# Patient Record
Sex: FEMALE | Race: WHITE | NOT HISPANIC OR LATINO | Employment: FULL TIME | URBAN - METROPOLITAN AREA
[De-identification: names, ages, dates, MRNs, and addresses within clinical notes are randomized per-mention and may not be internally consistent; named-entity substitution may affect disease eponyms.]

---

## 2017-08-16 ENCOUNTER — TRANSFERRED RECORDS (OUTPATIENT)
Dept: HEALTH INFORMATION MANAGEMENT | Facility: CLINIC | Age: 47
End: 2017-08-16

## 2017-08-16 LAB
CHOLEST SERPL-MCNC: 242 MG/DL (ref 100–199)
GLUCOSE SERPL-MCNC: 90 MG/DL (ref 65–99)
HDLC SERPL-MCNC: 47 MG/DL
LDLC SERPL CALC-MCNC: 167 MG/DL (ref 0–99)
TRIGL SERPL-MCNC: 140 MG/DL (ref 0–149)

## 2017-10-12 ENCOUNTER — ALLIED HEALTH/NURSE VISIT (OUTPATIENT)
Dept: FAMILY MEDICINE | Facility: CLINIC | Age: 47
End: 2017-10-12
Payer: COMMERCIAL

## 2017-10-12 DIAGNOSIS — Z23 NEED FOR PROPHYLACTIC VACCINATION AND INOCULATION AGAINST INFLUENZA: Primary | ICD-10-CM

## 2017-10-12 PROCEDURE — 90471 IMMUNIZATION ADMIN: CPT

## 2017-10-12 PROCEDURE — 90686 IIV4 VACC NO PRSV 0.5 ML IM: CPT

## 2017-10-12 NOTE — MR AVS SNAPSHOT
After Visit Summary   10/12/2017    Sugar Bishop    MRN: [id number]           Patient Information     Date Of Birth          1970        Visit Information        Provider Department      10/12/2017 11:00 AM NL FLOAT TEAM D [facility]        Today's Diagnoses     Need for prophylactic vaccination and inoculation against influenza    -  1       Follow-ups after your visit        Who to contact     If you have questions or need follow up information about today's clinic visit or your schedule please contact [facility] directly at [phone].  Normal or non-critical lab and imaging results will be communicated to you by OneRiothart, letter or phone within 4 business days after the clinic has received the results. If you do not hear from us within 7 days, please contact the clinic through Editlitet or phone. If you have a critical or abnormal lab result, we will notify you by phone as soon as possible.  Submit refill requests through Counsyl or call your pharmacy and they will forward the refill request to us. Please allow 3 business days for your refill to be completed.          Additional Information About Your Visit        MyChart Information     Counsyl gives you secure access to your electronic health record. If you see a primary care provider, you can also send messages to your care team and make appointments. If you have questions, please call your primary care clinic.  If you do not have a primary care provider, please call [phone] and they will assist you.        Care EveryWhere ID     This is your Care EveryWhere ID. This could be used by other organizations to access your [place] medical records  [id number]         Blood Pressure from Last 3 Encounters:   09/20/16 104/70   05/17/16 110/60   07/24/13 102/68    Weight from Last 3 Encounters:   09/20/16 146 lb 6.4 oz (66.4 kg)   05/17/16 145 lb 8 oz (66 kg)   07/24/13 146 lb 14.4 oz (66.6 kg)               We Performed the Following     FLU VAC, SPLIT VIRUS IM > 3 YO (QUADRIVALENT) [75495]     Vaccine Administration, Initial [84570]        Primary Care Provider    None Specified       No primary provider on file.        Equal Access to Services     [place] : Hadii aad ku hadsloaneross Ralph, perry davis, jf layne, agustin paul jose enriquegabriella alvarado elo lund. So [facility] [phone].    ATENCIÓN: Si habla español, tiene a bustillo disposición servicios gratuitos de asistencia lingüística. Llame al [phone].    We comply with applicable federal civil rights laws and [place] laws. We do not discriminate on the basis of race, color, national origin, age, disability, sex, sexual orientation, or gender identity.            Thank you!     Thank you for choosing [facility]  for your care. Our goal is always to provide you with excellent care. Hearing back from our patients is one way we can continue to improve our services. Please take a few minutes to complete the written survey that you may receive in the mail after your visit with us. Thank you!             Your Updated Medication List - Protect others around you: Learn how to safely use, store and throw away your medicines at www.disposemymeds.org.          This list is accurate as of: 10/12/17 11:13 AM.  Always use your most recent med list.                   Brand Name Dispense Instructions for use Diagnosis    aspirin 81 MG tablet      Take  by mouth daily.        aspirin-acetaminophen-caffeine 250-250-65 MG per tablet    EXCEDRIN MIGRAINE     Take 2 tablets by mouth every 6 hours as needed.        ferrous sulfate 325 (65 FE) MG tablet    IRON     Take by mouth daily (with breakfast)        FISH OIL PO      Take  by mouth.        ibuprofen 600 MG tablet    ADVIL/MOTRIN    30 tablet    Take 1 tablet by mouth every 6 hours as needed for pain.        MAGNESIUM OXIDE PO      Take 250 mg by mouth        potassium gluconate 2.5 MEQ Tabs  tablet           TYLENOL 325 MG tablet   Generic drug:  acetaminophen      Take 1-2 tablets by mouth every 6 hours as needed.        * UNABLE TO FIND      MEDICATION NAME: Allergy capsule        * UNABLE TO FIND      MEDICATION NAME: Fiber 1 cap three times per day        VITAMIN B 12 PO      Take 1,000 mcg by mouth        VITAMIN D3 PO      Take 1,000 Units by mouth daily        VITAMIN E NATURAL PO           zinc sulfate 220 (50 ZN) MG capsule    ZINCATE     Take 50 mg by mouth daily        * Notice:  This list has 2 medication(s) that are the same as other medications prescribed for you. Read the directions carefully, and ask your doctor or other care provider to review them with you.

## 2017-10-12 NOTE — PROGRESS NOTES
Injectable Influenza Immunization Documentation    1.  Is the person to be vaccinated sick today?   No    2. Does the person to be vaccinated have an allergy to a component   of the vaccine?   No    3. Has the person to be vaccinated ever had a serious reaction   to influenza vaccine in the past?   No    4. Has the person to be vaccinated ever had Guillain-Barré syndrome?   No    Form completed by Betsy Bautista CMA

## 2018-07-06 ENCOUNTER — HEALTH MAINTENANCE LETTER (OUTPATIENT)
Age: 48
End: 2018-07-06

## 2018-08-01 ENCOUNTER — OFFICE VISIT (OUTPATIENT)
Dept: URGENT CARE | Facility: RETAIL CLINIC | Age: 48
End: 2018-08-01
Payer: COMMERCIAL

## 2018-08-01 VITALS
SYSTOLIC BLOOD PRESSURE: 106 MMHG | HEART RATE: 60 BPM | OXYGEN SATURATION: 100 % | TEMPERATURE: 97.6 F | DIASTOLIC BLOOD PRESSURE: 66 MMHG

## 2018-08-01 DIAGNOSIS — L25.9 CONTACT DERMATITIS, UNSPECIFIED CONTACT DERMATITIS TYPE, UNSPECIFIED TRIGGER: ICD-10-CM

## 2018-08-01 DIAGNOSIS — J01.90 ACUTE SINUSITIS WITH SYMPTOMS > 10 DAYS: Primary | ICD-10-CM

## 2018-08-01 PROCEDURE — 99214 OFFICE O/P EST MOD 30 MIN: CPT | Performed by: PHYSICIAN ASSISTANT

## 2018-08-01 RX ORDER — TRIAMCINOLONE ACETONIDE 1 MG/G
CREAM TOPICAL
Qty: 15 G | Refills: 0 | Status: SHIPPED | OUTPATIENT
Start: 2018-08-01 | End: 2020-12-09

## 2018-08-01 ASSESSMENT — ENCOUNTER SYMPTOMS
BACK PAIN: 0
ARTHRALGIAS: 0
SORE THROAT: 0
DIARRHEA: 0
WHEEZING: 0
ABDOMINAL PAIN: 0
TROUBLE SWALLOWING: 0
NAUSEA: 0
CHILLS: 0
COUGH: 1
SINUS PAIN: 1
SINUS PRESSURE: 1
FEVER: 0
MYALGIAS: 0
PALPITATIONS: 0
VOMITING: 0
RHINORRHEA: 0
FATIGUE: 0
SHORTNESS OF BREATH: 0
EYE REDNESS: 0
EYE PAIN: 0
UNEXPECTED WEIGHT CHANGE: 0
CHEST TIGHTNESS: 0

## 2018-08-01 NOTE — MR AVS SNAPSHOT
After Visit Summary   8/1/2018    Sugar Bishop    MRN: [id number]           Patient Information     Date Of Birth          1970        Visit Information        Provider Department      8/1/2018 12:50 PM Aisha Kovacs PA-C [facility]        Today's Diagnoses     Acute sinusitis with symptoms > 10 days    -  1    Contact dermatitis, unspecified contact dermatitis type, unspecified trigger           Follow-ups after your visit        Follow-up notes from your care team     Return if symptoms worsen or fail to improve.      Who to contact     You can reach your care team any time of the day by calling [phone].  Notification of test results:  If you have an abnormal lab result, we will notify you by phone as soon as possible.         Additional Information About Your Visit        MyChart Information     Mode De Fairehart gives you secure access to your electronic health record. If you see a primary care provider, you can also send messages to your care team and make appointments. If you have questions, please call your primary care clinic.  If you do not have a primary care provider, please call [phone] and they will assist you.        Care EveryWhere ID     This is your Care EveryWhere ID. This could be used by other organizations to access your [place] medical records  [id number]        Your Vitals Were     Pulse Temperature Pulse Oximetry             60 97.6  F (36.4  C) (Oral) 100%          Blood Pressure from Last 3 Encounters:   08/01/18 106/66   09/20/16 104/70   05/17/16 110/60    Weight from Last 3 Encounters:   09/20/16 146 lb 6.4 oz (66.4 kg)   05/17/16 145 lb 8 oz (66 kg)   07/24/13 146 lb 14.4 oz (66.6 kg)              Today, you had the following     No orders found for display         Today's Medication Changes          These changes are accurate as of 8/1/18  1:27 PM.  If you have any questions, ask your nurse or doctor.               Start taking these  medicines.        Dose/Directions    amoxicillin-clavulanate 875-125 MG per tablet   Commonly known as:  AUGMENTIN   Used for:  Acute sinusitis with symptoms > 10 days   Started by:  Aisha Kovacs PA-C        Dose:  1 tablet   Take 1 tablet by mouth 2 times daily for 10 days   Quantity:  20 tablet   Refills:  0       triamcinolone 0.1 % cream   Commonly known as:  KENALOG   Used for:  Contact dermatitis, unspecified contact dermatitis type, unspecified trigger   Started by:  Aisha Kovacs PA-C        Apply sparingly to affected area 2-3 times daily for 7-14 days.   Quantity:  15 g   Refills:  0            Where to get your medicines      These medications were sent to [address]  [address], [facility] [address]     Phone:  [phone]     amoxicillin-clavulanate 875-125 MG per tablet    triamcinolone 0.1 % cream                Primary Care Provider Fax #    Physician No Ref-Primary [phone]       No address on file        Equal Access to Services     [facility]: Hadii paradise ku hadasho Soomaali, waaxda luqadaha, qaybta kaalmada adeegyada, waxay wilbertin hayaubree gasca . So [facility] [phone].    ATENCIÓN: Si habla español, tiene a bustillo disposición servicios gratuitos de asistencia lingüística. [facility] [phone].    We comply with applicable federal civil rights laws and [place] laws. We do not discriminate on the basis of race, color, national origin, age, disability, sex, sexual orientation, or gender identity.            Thank you!     Thank you for choosing [facility]  for your care. Our goal is always to provide you with excellent care. Hearing back from our patients is one way we can continue to improve our services. Please take a few minutes to complete the written survey that you may receive in the mail after your visit with us. Thank you!             Your Updated Medication List - Protect others around you: Learn how to  safely use, store and throw away your medicines at www.disposemymeds.org.          This list is accurate as of 8/1/18  1:27 PM.  Always use your most recent med list.                   Brand Name Dispense Instructions for use Diagnosis    amoxicillin-clavulanate 875-125 MG per tablet    AUGMENTIN    20 tablet    Take 1 tablet by mouth 2 times daily for 10 days    Acute sinusitis with symptoms > 10 days       aspirin 81 MG tablet      Take  by mouth daily.        aspirin-acetaminophen-caffeine 250-250-65 MG per tablet    EXCEDRIN MIGRAINE     Take 2 tablets by mouth every 6 hours as needed.        ferrous sulfate 325 (65 Fe) MG tablet    IRON     Take by mouth daily (with breakfast)        FISH OIL PO      Take  by mouth.        ibuprofen 600 MG tablet    ADVIL/MOTRIN    30 tablet    Take 1 tablet by mouth every 6 hours as needed for pain.        MAGNESIUM OXIDE PO      Take 250 mg by mouth        potassium gluconate 2.5 MEQ Tabs tablet           triamcinolone 0.1 % cream    KENALOG    15 g    Apply sparingly to affected area 2-3 times daily for 7-14 days.    Contact dermatitis, unspecified contact dermatitis type, unspecified trigger       TYLENOL 325 MG tablet   Generic drug:  acetaminophen      Take 1-2 tablets by mouth every 6 hours as needed.        * UNABLE TO FIND      MEDICATION NAME: Allergy capsule        * UNABLE TO FIND      MEDICATION NAME: Fiber 1 cap three times per day        VITAMIN B 12 PO      Take 1,000 mcg by mouth        VITAMIN D3 PO      Take 1,000 Units by mouth daily        VITAMIN E NATURAL PO           zinc sulfate 220 (50 Zn) MG capsule    ZINCATE     Take 50 mg by mouth daily        * Notice:  This list has 2 medication(s) that are the same as other medications prescribed for you. Read the directions carefully, and ask your doctor or other care provider to review them with you.

## 2018-08-01 NOTE — PROGRESS NOTES
SUBJECTIVE:   Sugar Bishop is a 47 year old female presenting with a chief complaint of   Chief Complaint   Patient presents with     Cough     productive cough x 3 weeks      Nasal Congestion     nasal congestion x 3 weeks      Derm Problem     posion ivy x 1 week, it is spreading - using a posion ivy wash not working        She is an established patient of [facility].    URI Adult    Onset of symptoms was 3 week(s) ago.  Course of illness is same.    Severity moderate  Current and Associated symptoms: cough - productive and facial pain/pressure  Treatment measures tried include Tylenol/Ibuprofen.  Predisposing factors include None.      Rash    Onset of rash was 1 week(s) ago.   Course of illness is sudden onset.  Severity moderate  Current and Associated symptoms: itching, red and blistering   Location of the rash: arm, lower.  Previous history of a similar rash? No  Recent exposure history: was pulling weeds, not sure if she saw poison ivy  Denies exposure to: dietary change, medications, new household products and new skincare products  Associated symptoms include: nothing.  Treatment measures tried include: moisturizer      Review of Systems   Constitutional: Negative for chills, fatigue, fever and unexpected weight change.   HENT: Positive for sinus pain and sinus pressure. Negative for congestion, ear pain, postnasal drip, rhinorrhea, sore throat and trouble swallowing.    Eyes: Negative for pain, redness and visual disturbance.   Respiratory: Positive for cough. Negative for chest tightness, shortness of breath and wheezing.    Cardiovascular: Negative for chest pain and palpitations.   Gastrointestinal: Negative for abdominal pain, diarrhea, nausea and vomiting.   Musculoskeletal: Negative for arthralgias, back pain and myalgias.   Skin: Positive for rash.       Past Medical History:   Diagnosis Date     Contusion of right hand, initial encounter 5/17/2016     Pain of right hand 5/17/2016     Tendonitis  5/17/2016    right hand      Family History   Problem Relation Age of Onset     Cancer Father      ? type     Current Outpatient Prescriptions   Medication Sig Dispense Refill     amoxicillin-clavulanate (AUGMENTIN) 875-125 MG per tablet Take 1 tablet by mouth 2 times daily for 10 days 20 tablet 0     triamcinolone (KENALOG) 0.1 % cream Apply sparingly to affected area 2-3 times daily for 7-14 days. 15 g 0     UNABLE TO FIND MEDICATION NAME: Allergy capsule       acetaminophen (TYLENOL) 325 MG tablet Take 1-2 tablets by mouth every 6 hours as needed.       aspirin 81 MG tablet Take  by mouth daily.       aspirin-acetaminophen-caffeine (EXCEDRIN MIGRAINE) 250-250-65 MG per tablet Take 2 tablets by mouth every 6 hours as needed.       Cholecalciferol (VITAMIN D3 PO) Take 1,000 Units by mouth daily       Cyanocobalamin (VITAMIN B 12 PO) Take 1,000 mcg by mouth       ferrous sulfate (IRON) 325 (65 FE) MG tablet Take by mouth daily (with breakfast)       ibuprofen (ADVIL,MOTRIN) 600 MG tablet Take 1 tablet by mouth every 6 hours as needed for pain. 30 tablet 1     MAGNESIUM OXIDE PO Take 250 mg by mouth       Omega-3 Fatty Acids (FISH OIL PO) Take  by mouth.       potassium gluconate 2.5 MEQ TABS tablet        UNABLE TO FIND MEDICATION NAME: Fiber 1 cap three times per day       VITAMIN E NATURAL PO        zinc sulfate (ZINCATE) 220 MG capsule Take 50 mg by mouth daily       Social History   Substance Use Topics     Smoking status: Former Smoker     Packs/day: 0.50     Quit date: 5/1/2004     Smokeless tobacco: Never Used     Alcohol use Yes      Comment: Less than once/year       OBJECTIVE  /66  Pulse 60  Temp 97.6  F (36.4  C) (Oral)  SpO2 100%    Physical Exam   Constitutional: She appears well-developed and well-nourished.   HENT:   Head: Normocephalic.   Right Ear: Tympanic membrane and ear canal normal.   Left Ear: Tympanic membrane and ear canal normal.   Nose: Mucosal edema and rhinorrhea present.    Mouth/Throat: Oropharynx is clear and moist.   Eyes: Conjunctivae are normal. Pupils are equal, round, and reactive to light.   Cardiovascular: Normal rate and normal heart sounds.    Pulmonary/Chest: Effort normal and breath sounds normal.   Skin: Skin is warm and dry.        Dermatitis type rash on both forearms. No signs of infection.    Psychiatric: She has a normal mood and affect. Her behavior is normal.       Labs:  No results found for this or any previous visit (from the past 24 hour(s)).    X-Ray was not done.    ASSESSMENT:      ICD-10-CM    1. Acute sinusitis with symptoms > 10 days J01.90 amoxicillin-clavulanate (AUGMENTIN) 875-125 MG per tablet   2. Contact dermatitis, unspecified contact dermatitis type, unspecified trigger L25.9 triamcinolone (KENALOG) 0.1 % cream        Medical Decision Making:    Differential Diagnosis:  URI Adult/Peds:  Allergic rhinitis, Sinusitis and Viral upper respiratory illness  Rash: Atopic dermatitis  Contact dermatitis  Dermatitis  Non-specific rash    Serious Comorbid Conditions:  Adult:  None    PLAN:    URI Adult:  Tylenol, Ibuprofen, Fluids, Rest, OTC cough suppressant/expectorant, OTC decongestant/antihistamine and RX sinusitis  Augmentin 875 bid x 10 days     Rash:  Rx triamcinolone x 7-14 days. Avoid new exposures. Avoid scratching. Return to clinic if symptoms worsen or do not improve; otherwise follow up as needed      Followup:    If not improving or if condition worsens, follow up with your Primary Care Provider    There are no Patient Instructions on file for this visit.

## 2018-08-29 ENCOUNTER — TRANSFERRED RECORDS (OUTPATIENT)
Dept: HEALTH INFORMATION MANAGEMENT | Facility: CLINIC | Age: 48
End: 2018-08-29

## 2018-08-30 LAB
CHOLEST SERPL-MCNC: 218 MG/DL (ref 100–199)
HDLC SERPL-MCNC: 39 MG/DL (ref 39–?)
LDLC SERPL CALC-MCNC: 136 MG/DL (ref 0–99)
NONHDLC SERPL-MCNC: ABNORMAL MG/DL
TRIGL SERPL-MCNC: 217 MG/DL (ref 0–149)

## 2018-09-14 ENCOUNTER — DOCUMENTATION ONLY (OUTPATIENT)
Dept: CARDIOLOGY | Facility: CLINIC | Age: 48
End: 2018-09-14

## 2018-10-23 ENCOUNTER — ALLIED HEALTH/NURSE VISIT (OUTPATIENT)
Dept: FAMILY MEDICINE | Facility: CLINIC | Age: 48
End: 2018-10-23
Payer: COMMERCIAL

## 2018-10-23 DIAGNOSIS — Z23 NEED FOR PROPHYLACTIC VACCINATION AND INOCULATION AGAINST INFLUENZA: Primary | ICD-10-CM

## 2018-10-23 PROCEDURE — 90471 IMMUNIZATION ADMIN: CPT

## 2018-10-23 PROCEDURE — 90686 IIV4 VACC NO PRSV 0.5 ML IM: CPT

## 2018-10-23 PROCEDURE — 99207 ZZC NO CHARGE NURSE ONLY: CPT

## 2018-10-23 NOTE — MR AVS SNAPSHOT
After Visit Summary   10/23/2018    Sugar Bishop    MRN: [id number]           Patient Information     Date Of Birth          1970        Visit Information        Provider Department      10/23/2018 9:45 AM ANKIT GAINES MA [facility]        Today's Diagnoses     Need for prophylactic vaccination and inoculation against influenza    -  1       Follow-ups after your visit        Who to contact     If you have questions or need follow up information about today's clinic visit or your schedule please contact [facility] directly at [phone].  Normal or non-critical lab and imaging results will be communicated to you by N4MDhart, letter or phone within 4 business days after the clinic has received the results. If you do not hear from us within 7 days, please contact the clinic through I AM AT or phone. If you have a critical or abnormal lab result, we will notify you by phone as soon as possible.  Submit refill requests through I AM AT or call your pharmacy and they will forward the refill request to us. Please allow 3 business days for your refill to be completed.          Additional Information About Your Visit        MyChart Information     I AM AT gives you secure access to your electronic health record. If you see a primary care provider, you can also send messages to your care team and make appointments. If you have questions, please call your primary care clinic.  If you do not have a primary care provider, please call [phone] and they will assist you.        Care EveryWhere ID     This is your Care EveryWhere ID. This could be used by other organizations to access your [place] medical records  [id number]         Blood Pressure from Last 3 Encounters:   08/01/18 106/66   09/20/16 104/70   05/17/16 110/60    Weight from Last 3 Encounters:   09/20/16 146 lb 6.4 oz (66.4 kg)   05/17/16 145 lb 8 oz (66 kg)   07/24/13 146 lb 14.4 oz (66.6 kg)              We  Performed the Following     FLU VACCINE, SPLIT VIRUS, IM (QUADRIVALENT) [31857]- >3 YRS     Vaccine Administration, Initial [44241]        Primary Care Provider Fax #    Physician No Ref-Primary [phone]       No address on file        Equal Access to Services     [place] : Db paradise puentes thaddeus Rosas, wamanishada luqadaha, [place] kaalsatnam layne, agustin wilbertin hayaaguanaco quispegabriella alvarado elo lund. So [facility] [phone].    ATENCIÓN: Si habla español, tiene a bustillo disposición servicios gratuitos de asistencia lingüística. Llame al [phone].    We comply with applicable federal civil rights laws and [place] laws. We do not discriminate on the basis of race, color, national origin, age, disability, sex, sexual orientation, or gender identity.            Thank you!     Thank you for choosing [facility]  for your care. Our goal is always to provide you with excellent care. Hearing back from our patients is one way we can continue to improve our services. Please take a few minutes to complete the written survey that you may receive in the mail after your visit with us. Thank you!             Your Updated Medication List - Protect others around you: Learn how to safely use, store and throw away your medicines at www.disposemymeds.org.          This list is accurate as of 10/23/18 10:24 AM.  Always use your most recent med list.                   Brand Name Dispense Instructions for use Diagnosis    aspirin 81 MG tablet      Take  by mouth daily.        aspirin-acetaminophen-caffeine 250-250-65 MG per tablet    EXCEDRIN MIGRAINE     Take 2 tablets by mouth every 6 hours as needed.        ferrous sulfate 325 (65 Fe) MG tablet    IRON     Take by mouth daily (with breakfast)        FISH OIL PO      Take  by mouth.        ibuprofen 600 MG tablet    ADVIL/MOTRIN    30 tablet    Take 1 tablet by mouth every 6 hours as needed for pain.        MAGNESIUM OXIDE PO      Take 250 mg by mouth        potassium  gluconate 2.5 MEQ Tabs tablet           triamcinolone 0.1 % cream    KENALOG    15 g    Apply sparingly to affected area 2-3 times daily for 7-14 days.    Contact dermatitis, unspecified contact dermatitis type, unspecified trigger       TYLENOL 325 MG tablet   Generic drug:  acetaminophen      Take 1-2 tablets by mouth every 6 hours as needed.        * UNABLE TO FIND      MEDICATION NAME: Allergy capsule        * UNABLE TO FIND      MEDICATION NAME: Fiber 1 cap three times per day        VITAMIN B 12 PO      Take 1,000 mcg by mouth        VITAMIN D3 PO      Take 1,000 Units by mouth daily        VITAMIN E NATURAL PO           zinc sulfate 220 (50 Zn) MG capsule    ZINCATE     Take 50 mg by mouth daily        * Notice:  This list has 2 medication(s) that are the same as other medications prescribed for you. Read the directions carefully, and ask your doctor or other care provider to review them with you.

## 2018-10-23 NOTE — PROGRESS NOTES
Injectable Influenza Immunization Documentation    1.  Is the person to be vaccinated sick today?   No    2. Does the person to be vaccinated have an allergy to a component   of the vaccine?   No  Egg Allergy Algorithm Link    3. Has the person to be vaccinated ever had a serious reaction   to influenza vaccine in the past?   No    4. Has the person to be vaccinated ever had Guillain-Barré syndrome?   No    Prior to injection verified patient identity using patient's name and date of birth.  Due to injection administration, patient instructed to remain in clinic for 15 minutes  afterwards, and to report any adverse reaction to me immediately.      Form completed by Lorena Armas MA

## 2019-02-18 ENCOUNTER — ANCILLARY PROCEDURE (OUTPATIENT)
Dept: GENERAL RADIOLOGY | Facility: OTHER | Age: 49
End: 2019-02-18
Attending: NURSE PRACTITIONER
Payer: COMMERCIAL

## 2019-02-18 ENCOUNTER — OFFICE VISIT (OUTPATIENT)
Dept: FAMILY MEDICINE | Facility: OTHER | Age: 49
End: 2019-02-18
Payer: COMMERCIAL

## 2019-02-18 VITALS
SYSTOLIC BLOOD PRESSURE: 110 MMHG | DIASTOLIC BLOOD PRESSURE: 64 MMHG | BODY MASS INDEX: 24.02 KG/M2 | TEMPERATURE: 97.8 F | WEIGHT: 144.19 LBS | HEART RATE: 91 BPM | RESPIRATION RATE: 16 BRPM | OXYGEN SATURATION: 100 % | HEIGHT: 65 IN

## 2019-02-18 DIAGNOSIS — M79.671 RIGHT FOOT PAIN: ICD-10-CM

## 2019-02-18 DIAGNOSIS — M79.671 RIGHT FOOT PAIN: Primary | ICD-10-CM

## 2019-02-18 PROCEDURE — 73630 X-RAY EXAM OF FOOT: CPT | Mod: RT

## 2019-02-18 PROCEDURE — 99213 OFFICE O/P EST LOW 20 MIN: CPT | Performed by: NURSE PRACTITIONER

## 2019-02-18 ASSESSMENT — MIFFLIN-ST. JEOR: SCORE: 1278.56

## 2019-02-18 NOTE — PATIENT INSTRUCTIONS
I did not see any abnormalities on your x-ray.  The radiologist will review it as well and they will call if they see anything I did not see.     I would like you to see the podiatrist for further evaluation of this.

## 2019-02-18 NOTE — PROGRESS NOTES
SUBJECTIVE:   Sugar Bishop is a 48 year old female who presents to clinic today for the following health issues:      Musculoskeletal problem/pain      Duration:  Since Dec 30th     Description    Location: Patient was carrying stuff down stairs and fell. Ankle rolled after stepping off a step wrong.      Intensity:  Severe if she twists or turns it     Accompanying signs and symptoms: numbness and tingling    History  Previous similar problem: no   Previous evaluation:  none    Precipitating or alleviating factors:  Trauma or overuse: YES  Aggravating factors include: sitting, standing and walking  Therapies tried and outcome: rest/inactivity, heat and ice. Just doesn't seem to be getting better.     Injury was on .  She was not seen at the time.  She was walking down a step and missed the last step.  She rolled her foot forward and hyperextended her ankle.  Had immediate pain and was able to walk for several days.   Now can walk, but still has significant pain with any extension of her ankle.     Problem list and histories reviewed & adjusted, as indicated.  Additional history: as documented    Patient Active Problem List   Diagnosis     CARDIOVASCULAR SCREENING; LDL GOAL LESS THAN 160     Other viral warts     Tendonitis     Pain of right hand     Contusion of right hand, initial encounter     Past Surgical History:   Procedure Laterality Date      SHLDR ARTHROSCOP,PART ACROMIOPLAS      left     PELVIS LAPAROSCOPY,DX       PELVIS LAPAROSCOPY,DX         Social History     Tobacco Use     Smoking status: Former Smoker     Packs/day: 0.50     Last attempt to quit: 2004     Years since quittin.8     Smokeless tobacco: Never Used   Substance Use Topics     Alcohol use: Yes     Comment: Less than once/year     Family History   Problem Relation Age of Onset     Cancer Father         ? type         Current Outpatient Medications   Medication Sig Dispense Refill     acetaminophen  "(TYLENOL) 325 MG tablet Take 1-2 tablets by mouth every 6 hours as needed.       aspirin 81 MG tablet Take  by mouth daily.       aspirin-acetaminophen-caffeine (EXCEDRIN MIGRAINE) 250-250-65 MG per tablet Take 2 tablets by mouth every 6 hours as needed.       Cholecalciferol (VITAMIN D3 PO) Take 1,000 Units by mouth daily       Cyanocobalamin (VITAMIN B 12 PO) Take 1,000 mcg by mouth       ferrous sulfate (IRON) 325 (65 FE) MG tablet Take by mouth daily (with breakfast)       ibuprofen (ADVIL,MOTRIN) 600 MG tablet Take 1 tablet by mouth every 6 hours as needed for pain. 30 tablet 1     MAGNESIUM OXIDE PO Take 250 mg by mouth       Omega-3 Fatty Acids (FISH OIL PO) Take  by mouth.       potassium gluconate 2.5 MEQ TABS tablet        triamcinolone (KENALOG) 0.1 % cream Apply sparingly to affected area 2-3 times daily for 7-14 days. (Patient not taking: Reported on 2/18/2019) 15 g 0     UNABLE TO FIND MEDICATION NAME: Allergy capsule       UNABLE TO FIND MEDICATION NAME: Fiber 1 cap three times per day       VITAMIN E NATURAL PO        zinc sulfate (ZINCATE) 220 MG capsule Take 50 mg by mouth daily       Allergies   Allergen Reactions     Sulfa Drugs      BP Readings from Last 3 Encounters:   02/18/19 110/64   08/01/18 106/66   09/20/16 104/70    Wt Readings from Last 3 Encounters:   02/18/19 65.4 kg (144 lb 3 oz)   09/20/16 66.4 kg (146 lb 6.4 oz)   05/17/16 66 kg (145 lb 8 oz)                    Reviewed and updated as needed this visit by clinical staff  Tobacco  Allergies  Med Hx  Surg Hx  Fam Hx  Soc Hx      Reviewed and updated as needed this visit by Provider         ROS:  Constitutional, HEENT, cardiovascular, pulmonary, gi and gu systems are negative, except as otherwise noted.    OBJECTIVE:     /64 (BP Location: Left arm, Patient Position: Sitting, Cuff Size: Adult Regular)   Pulse 91   Temp 97.8  F (36.6  C) (Temporal)   Resp 16   Ht 1.641 m (5' 4.6\")   Wt 65.4 kg (144 lb 3 oz)   LMP " 02/07/2019   SpO2 100%   Breastfeeding? No   BMI 24.29 kg/m    Body mass index is 24.29 kg/m .  GENERAL: healthy, alert and no distress  MS: no gross musculoskeletal defects noted, no edema.  Right ankle has no deformity, swelling or bruising.  Has tenderness over the medial ankle, tenderness specifically with extension of her foot downwards.      Diagnostic Test Results:  X-ray: right foot: negative.       ASSESSMENT/PLAN:         1. Right foot pain  Follow up with podiatry for further evaluation and treatment.   - XR Foot Right G/E 3 Views; Future  - PODIATRY/FOOT & ANKLE SURGERY REFERRAL  - PODIATRY/FOOT & ANKLE SURGERY REFERRAL    See Patient Instructions    CATALINA [facility]

## 2019-04-16 ENCOUNTER — TELEPHONE (OUTPATIENT)
Dept: FAMILY MEDICINE | Facility: OTHER | Age: 49
End: 2019-04-16

## 2019-04-16 NOTE — LETTER
[facility]  [address] [facility] [address] [phone]        Sugar Bishop     [facility] [address]      April 29, 2019      Dear Sugar,    I care about your health and have reviewed your health plan, including your medical conditions, medication list, and lab results and am making recommendations based on this review, to better manage your health.    You are in particular need of attention regarding:  -Breast Cancer Screening  -Cervical Cancer Screening  -Wellness (Physical) Visit     I am recommending that you:  -schedule a WELLNESS (Physical) APPOINTMENT with me.   I will check fasting labs the same day - nothing to eat except water and meds for 8-10 hours prior.  -schedule a MAMMOGRAM which is due. You can call  [phone] to schedule your mammogram.    -schedule a PAP SMEAR EXAM. This is a screening test done during a pelvic exam to check for abnormal changes in the cells of the cervix.  Cervical cancer is preventable and curable if abnormal cells are detected and treated early. You can call your clinic at the number listed above to schedule your Pap Smear.      If you've had the preventative screening completed at another facility or feel you're not due for this screening, please call our clinic at the number listed above or send us a My Chart message so we can update our records. We would like to thank you in advance for taking the time to take care of your health.  If you have any questions, please don t hesitate to contact our clinic.    Sincerely,       [facility]

## 2019-04-16 NOTE — TELEPHONE ENCOUNTER
Panel Management Review      Patient has the following on her problem list: None      Composite cancer screening  Chart review shows that this patient is due/due soon for the following Pap Smear and Mammogram  Summary:    Patient is due/failing the following:   MAMMOGRAM, PAP and PHYSICAL    Action needed:   Patient needs office visit for Physical with pap. and Patient needs referral/order: Mammo    Type of outreach:    Sent [employer] message.    Questions for provider review:    None                                                                                                                                    Lorena Armas MA

## 2019-06-27 ENCOUNTER — OFFICE VISIT (OUTPATIENT)
Dept: FAMILY MEDICINE | Facility: OTHER | Age: 49
End: 2019-06-27
Payer: COMMERCIAL

## 2019-06-27 ENCOUNTER — NURSE TRIAGE (OUTPATIENT)
Dept: FAMILY MEDICINE | Facility: CLINIC | Age: 49
End: 2019-06-27

## 2019-06-27 VITALS
TEMPERATURE: 97.3 F | RESPIRATION RATE: 16 BRPM | DIASTOLIC BLOOD PRESSURE: 60 MMHG | WEIGHT: 146 LBS | BODY MASS INDEX: 24.32 KG/M2 | OXYGEN SATURATION: 100 % | HEART RATE: 72 BPM | HEIGHT: 65 IN | SYSTOLIC BLOOD PRESSURE: 108 MMHG
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
## 2019-06-27 PROCEDURE — 99213 OFFICE O/P EST LOW 20 MIN: CPT | Performed by: PHYSICIAN ASSISTANT

## 2019-06-27 ASSESSMENT — PAIN SCALES - GENERAL: PAINLEVEL: NO PAIN (1)

## 2019-06-27 ASSESSMENT — MIFFLIN-ST. JEOR: SCORE: 1286.78

## 2019-06-27 NOTE — TELEPHONE ENCOUNTER
"Is instructed she should be seen today in clinic.  She will call [facility] to see if they will see this issue for her, if not, she has an appointment scheduled in [facility] at 6:00.    She will call back to cancel appointment if she can go to [facility] for this.    Answer Assessment - Initial Assessment Questions  1. ANIMAL: \"What type of animal caused the bite?\" \"Is the injury from a bite or a claw?\" If the animal is a dog or a cat, ask: \"Was it a pet or a stray?\" \"Was it acting ill or behaving strangely?\"      Patient just got a rescue dog with up to date vaccinations.  Bite on fingers  2. LOCATION: \"Where is the bite located?\"       2 fingers, one is fine, the other is swollen, red, warm to the touch and has fluid coming from it.    Protocols used: ANIMAL BITE-A-OH    "

## 2019-06-27 NOTE — PROGRESS NOTES
"Subjective     Sugar Bishop is a 48 year old female who presents to clinic today for the following health issues:    HPI   Concern - Dog Bite  Onset:  Last night    Description:   Dog bit hand while he was scared, left pointer finger before first joint swollen and red: 1 puncture sight warm to touch     Intensity: mild    Progression of Symptoms:  same    Accompanying Signs & Symptoms:  none    Previous history of similar problem:   none    Precipitating factors:   Worsened by: none    Alleviating factors:  Improved by: none  Therapies Tried and outcome: none    - Rescue dog got about 1 year ago, up to date on all shots       RN Triage note from today  Is instructed she should be seen today in clinic.  She will call [facility] to see if they will see this issue for her, if not, she has an appointment scheduled in [facility] at 6:00.     She will call back to cancel appointment if she can go to [facility] for this.     Answer Assessment - Initial Assessment Questions  1. ANIMAL: \"What type of animal caused the bite?\" \"Is the injury from a bite or a claw?\" If the animal is a dog or a cat, ask: \"Was it a pet or a stray?\" \"Was it acting ill or behaving strangely?\"      Patient just got a rescue dog with up to date vaccinations.  Bite on fingers  2. LOCATION: \"Where is the bite located?\"       2 fingers, one is fine, the other is swollen, red, warm to the touch and has fluid coming from it.      Review of Systems   ROS COMP: Constitutional, HEENT, cardiovascular, pulmonary, gi and gu systems are negative, except as otherwise noted.      Objective    /60   Pulse 72   Temp 97.3  F (36.3  C) (Temporal)   Resp 16   Ht 1.641 m (5' 4.6\")   Wt 66.2 kg (146 lb)   LMP 06/07/2019 (Approximate)   SpO2 100%   BMI 24.60 kg/m    Body mass index is 24.6 kg/m .  Physical Exam   GENERAL APPEARANCE: healthy, alert and no distress  EYES: Eyes grossly normal to inspection, PERRLA, conjunctivae and sclerae " without injection or discharge, EOM intact   MS: No musculoskeletal defects are noted and gait is age appropriate without ataxia     Left hand 2nd digit palmar side: Normal ROM, normal sensory exam, normal strength, edema of entire finger, erythema around bite any on proximal portion, tender and warm to touch, not able to express any discharge, no fluctuance felt      Right hand 2nd digit dorsal side: Normal ROM, non-tender, no edema, CMS intact, normal sensory exam, normal strength, small bite any, scabbed over, no edema, no erythema   SKIN: No suspicious lesions or rashes, hydration status appears adeuqate with normal skin turgor   PSYCH: Alert and oriented x3; speech- coherent , normal rate and volume; able to articulate logical thoughts, able to abstract reason, no tangential thoughts, no hallucinations or delusions, mentation appears normal, Mood is euthymic. Affect is appropriate for this mood state and bright. Thought content is free of suicidal ideation, hallucinations, and delusions. Dress is adequate and upkept. Eye contact is good during conversation.       Diagnostic Test Results:  none         Assessment & Plan       ICD-10-CM    1. Dog bite, initial encounter W54.0XXA amoxicillin-clavulanate (AUGMENTIN) 875-125 MG tablet     - Discussed signs of skin infection but no abscess   - Recommend antibiotic  - Discussed antibiotic use, duration, and side effects  - Continue with good wound care  - Discussed watching for infection spreading or abscess formation   - Discussed warning signs that would warrant return to clinic/ED  - Hand out given       The patient indicates understanding of these issues and agrees with the plan.    Return in about 1 week (around 7/4/2019) for if not improving.       Kalee Stevens PA-C  [facility]

## 2019-06-27 NOTE — TELEPHONE ENCOUNTER
Additional Information    Puncture wound or small cut on hands or genitals    Bite looks infected (e.g., red area, red streak, pus, or fever)    Negative: Cut (length > 1/8 inch or 3 mm) or skin tear and any animal    Negative: Bleeding won't stop after 10 minutes of direct pressure (using correct technique)    Negative: Sounds like a serious bite injury to the triager    Negative: Any break in skin (e.g., cut, puncture, or scratch) and wild animal at RISK FOR RABIES (e.g., bat, raccoon, pratt, skunk, coyote, other carnivores)    Negative: Any break in skin (e.g., cut, puncture, or scratch) and dog, cat, or ferret at risk for RABIES (e.g., sick, stray, unprovoked bite, developing country)    Negative: Any break in skin (e.g., cut, puncture, or scratch) and monkey    Negative: Major bleeding (actively dripping or spurting) that can't be stopped    Negative: Sounds like a life-threatening emergency to the triager    Negative: SEVERE pain (e.g., excruciating)    Negative: Non-bite body fluid contact (e.g., saliva, brain) and animal at high-risk for RABIES (e.g., bat, raccoon, skunk, pratt, coyote, and other carnivores)    Negative: Puncture wound (holes through the skin) from cat (teeth or claws)    Negative: Puncture wound or small cut on face    Protocols used: ANIMAL BITE-A-OH

## 2019-06-27 NOTE — PATIENT INSTRUCTIONS
Patient Education     Dog Bite  A dog bite can cause a wound deep enough to break the skin. In such cases, the wound is cleaned and sometimes closed. If the wound is closed, it is usually not completely closed. This is so that fluid can drain if the wound becomes infected. Often, wounds will be left open to heal. In addition to wound care, a tetanus shot may be given, if needed.    Home care    Wash your hands well with soap and warm water before and after caring for the wound. This helps lower the risk of infection.    Care for the wound as directed. If a dressing was applied to the wound, be sure to change it as directed.    If the wound bleeds, place a clean, soft cloth on the wound. Then firmly apply pressure until the bleeding stops. This may take up to 5 minutes. Do not release the pressure and look at the wound during this time.    Most wounds heal within 10 days. But an infection can occur even with proper treatment. So be sure to check the wound daily for signs of infection (see below).    Antibiotics may be prescribed. These help prevent or treat infection. If you re given antibiotics, take them as directed. Also be sure to complete the medicines.  Rabies prevention  Rabies is a virus that can be carried in certain animals. These can include domestic animals such as dogs and cats. Pets fully vaccinated against rabies (2 shots) are at very low risk of infection. But because human rabies is almost always fatal, any biting pet should be confined for 10 days as an extra precaution. In general, if there is a risk for rabies, the following steps may need to be taken:    If someone s pet dog has bitten you, it should be kept in a secure area for the next 10 days to watch for signs of illness. (If the pet owner won t allow this, contact your local animal control center.) If the dog becomes ill or dies during that time, contact your local animal control center at once so the animal may be tested for rabies. If  the dog stays healthy for the next 10 days, there is no danger of rabies in the animal or you.  ? If a stray dog bit you, contact your local animal control center. They can give information on capture, quarantine, and animal rabies testing.  ? If you can t find the animal that bit you in the next 2 days, and if rabies exists in your area, you may need to receive the rabies vaccine series. Call your healthcare provider right away. Or, return to the emergency department promptly.  ? All animal bites should be reported to the local animal control center. If you were not given a form to fill out, you can report this yourself.  Follow-up care  Follow up with your healthcare provider, or as directed.  When to seek medical advice  Call your healthcare provider right away if any of these occur:    Signs of infection:  ? Spreading redness or warmth from the wound  ? Increased pain or swelling  ? Fever of 100.4 F (38 C) or higher, or as directed by your healthcare provider  ? Colored fluid or pus draining from the wound    Signs of rabies infection:  ? Headache  ? Confusion  ? Strange behavior  ? Increased salivating and drooling  ? Seizure    Decreased ability to move any body part near the wound    Bleeding that can't be stopped after 5 minutes of firm pressure  Date Last Reviewed: 3/1/2017    3011-2202 [employer]. [address] [place] [address], [place] [address]. All rights reserved. This information is not intended as a substitute for professional medical care. Always follow your healthcare professional's instructions.

## 2019-06-28 ENCOUNTER — TELEPHONE (OUTPATIENT)
Dept: FAMILY MEDICINE | Facility: OTHER | Age: 49
End: 2019-06-28

## 2019-06-28 NOTE — TELEPHONE ENCOUNTER
Panel Management Review      Patient has the following on her problem list: None      Composite cancer screening  Chart review shows that this patient is due/due soon for the following Pap Smear and Mammogram  Summary:    Patient is due/failing the following:   MAMMOGRAM, PAP and PHYSICAL    Action needed:   Patient needs office visit for physical, pap, mammo.    Type of outreach:    Sent letter.    Questions for provider review:    None                                                                                                                                    ................Kel Londono LPN,   June 28, 2019,      1:41 PM,   [facility]       Chart routed to closed .

## 2019-09-10 ENCOUNTER — TELEPHONE (OUTPATIENT)
Dept: SCHEDULING | Facility: CLINIC | Age: 49
End: 2019-09-10

## 2019-12-08 ENCOUNTER — HEALTH MAINTENANCE LETTER (OUTPATIENT)
Age: 49
End: 2019-12-08

## 2020-03-15 ENCOUNTER — HEALTH MAINTENANCE LETTER (OUTPATIENT)
Age: 50
End: 2020-03-15

## 2020-12-09 ENCOUNTER — OFFICE VISIT (OUTPATIENT)
Dept: FAMILY MEDICINE | Facility: CLINIC | Age: 50
End: 2020-12-09
Payer: COMMERCIAL

## 2020-12-09 VITALS
TEMPERATURE: 99.3 F | OXYGEN SATURATION: 98 % | HEIGHT: 65 IN | RESPIRATION RATE: 18 BRPM | DIASTOLIC BLOOD PRESSURE: 80 MMHG | BODY MASS INDEX: 23.56 KG/M2 | HEART RATE: 94 BPM | SYSTOLIC BLOOD PRESSURE: 128 MMHG | WEIGHT: 141.38 LBS

## 2020-12-09 DIAGNOSIS — M25.531 RIGHT WRIST PAIN: Primary | ICD-10-CM

## 2020-12-09 PROCEDURE — 99213 OFFICE O/P EST LOW 20 MIN: CPT | Performed by: NURSE PRACTITIONER

## 2020-12-09 RX ORDER — INFLUENZA A VIRUS A/GUANGDONG-MAONAN/SWL1536/2019 CNIC-1909 (H1N1) ANTIGEN (FORMALDEHYDE INACTIVATED), INFLUENZA A VIRUS A/HONG KONG/2671/2019 (H3N2) ANTIGEN (FORMALDEHYDE INACTIVATED), INFLUENZA B VIRUS B/PHUKET/3073/2013 ANTIGEN (FORMALDEHYDE INACTIVATED), AND INFLUENZA B VIRUS B/WASHINGTON/02/2019 ANTIGEN (FORMALDEHYDE INACTIVATED) 15; 15; 15; 15 UG/.5ML; UG/.5ML; UG/.5ML; UG/.5ML
INJECTION, SUSPENSION INTRAMUSCULAR
COMMUNITY
Start: 2020-09-16 | End: 2021-08-12

## 2020-12-09 ASSESSMENT — PAIN SCALES - GENERAL: PAINLEVEL: MILD PAIN (3)

## 2020-12-09 ASSESSMENT — MIFFLIN-ST. JEOR: SCORE: 1255.8

## 2020-12-09 NOTE — PROGRESS NOTES
"Subjective     Sugar Bishop is a 50 year old female who presents to clinic today for the following health issues:    HPI         Musculoskeletal problem/pain  Onset/Duration: Last Thursday   Description  Location: wrist - right  Joint Swelling: YES  Redness: YES  Pain: YES  Warmth: no  Intensity:  mild  Progression of Symptoms:  improving  Accompanying signs and symptoms:   Fevers: no  Numbness/tingling/weakness: YES  History  Trauma to the area: no  Recent illness:  no  Previous similar problem: no  Previous evaluation:  no  Precipitating or alleviating factors:  Aggravating factors include: Using that hand or wrist to crasp or turn.   Therapies tried and outcome: nothing    Woke up last Thursday with pain in the right wrist. No acute injury, she works for [employer] but duties vary so no significant repetitive motions that she goes through. She works 2 hours a day. She otherwise is at home. No other acute symptoms of concern.     Review of Systems   Constitutional, HEENT, cardiovascular, pulmonary, gi and gu systems are negative, except as otherwise noted.      Objective    /80   Pulse 94   Temp 99.3  F (37.4  C) (Temporal)   Resp 18   Ht 1.641 m (5' 4.6\")   Wt 64.1 kg (141 lb 6 oz)   LMP 11/17/2020   SpO2 98%   Breastfeeding No   BMI 23.82 kg/m    Body mass index is 23.82 kg/m .  Physical Exam  Vitals signs reviewed.   Constitutional:       Appearance: Normal appearance.   HENT:      Head: Normocephalic and atraumatic.      Nose: Nose normal.   Eyes:      Extraocular Movements: Extraocular movements intact.   Pulmonary:      Effort: Pulmonary effort is normal.   Musculoskeletal:         General: Tenderness present.      Right wrist: She exhibits decreased range of motion, tenderness, bony tenderness and swelling.   Skin:     General: Skin is warm and dry.   Neurological:      General: No focal deficit present.      Mental Status: She is alert and oriented to person, place, and time. "   Psychiatric:         Thought Content: Thought content normal.         Judgment: Judgment normal.                    Assessment & Plan     Right wrist pain  - Discussed home care instructions for wrist sprain/strain - tendonitis.   - Will have her use RICE - Wrist sprint for wrist, gave her exercises for the wrist after the pain is improved.   - Wrist/Arm/Hand Supplies Order for DME - ONLY FOR DME      - She will call clinic if symptoms to not improve in the next 10-14 days.     - Patient verbalized understanding of plan of care and is in agreement with current plan of care.       Xander Kessler NP  [facility]

## 2020-12-09 NOTE — PATIENT INSTRUCTIONS
Patient Education     Muscle Strain in the Extremities  A muscle strain is a stretching and tearing of muscle fibers. This causes pain, especially when you move that muscle. There may also be some swelling and bruising.  Home care    Keep the hurt area raised above heart level to reduce pain and swelling. This is especially important during the first 48 hours.    Apply an ice pack over the injured area for 15 to 20 minutes every 3 to 6 hours. You should do this for the first 24 to 48 hours. You can make an ice pack by filling a plastic bag that seals at the top with ice cubes and then wrapping it with a thin towel. Be careful not to injure your skin with the ice treatments. Ice should never be applied directly to skin. Continue the use of ice packs for relief of pain and swelling as needed. After 48 hours, apply heat (warm shower or warm bath) for 15 to 20 minutes several times a day, or alternate ice and heat.    You may use over-the-counter pain medicine to control pain, unless another medicine was prescribed. If you have chronic liver or kidney disease or ever had a stomach ulcer or gastrointestinal bleeding, talk with your healthcare provider before using these medicines.    For leg strains: If crutches have been recommended, don t put full weight on the hurt leg until you can do so without pain. You can return to sports when you are able to hop and run on the injured leg without pain.  Follow-up care  Follow up with your healthcare provider, or as advised.  When to seek medical advice  Call your healthcare provider right away if any of these occur:    The toes of the injured leg become swollen, cold, blue, numb, or tingly    Pain or swelling increases  [employer] last reviewed this educational content on 5/1/2018 2000-2020 [employer]. [address] [place] [address], [place] [address]. All rights reserved. This information is not intended as a substitute for professional medical care. Always follow  your healthcare professional's instructions.           Patient Education     Wrist Pronation (Strength)    These instructions are for your right wrist. Switch sides for your left wrist.   1. Sit in a chair next to a table. Rest your right forearm on the table. Hang your right wrist off the edge of the table, palm up. Hold a hand weight in your right hand. Your healthcare provider will tell you what size of hand weight to use.  2. Keep your forearm in place and turn your wrist to the left until your thumb is on top.  3. Slowly lower the hand weight back down to the right.  4. Repeat 10 times, or as instructed.  [employer] last reviewed this educational content on 6/1/2019 2000-2020 [employer]. [address] [place] [address], [place] [address]. All rights reserved. This information is not intended as a substitute for professional medical care. Always follow your healthcare professional's instructions.

## 2021-01-09 ENCOUNTER — HEALTH MAINTENANCE LETTER (OUTPATIENT)
Age: 51
End: 2021-01-09

## 2021-05-08 ENCOUNTER — HEALTH MAINTENANCE LETTER (OUTPATIENT)
Age: 51
End: 2021-05-08

## 2021-08-10 ENCOUNTER — HOSPITAL ENCOUNTER (EMERGENCY)
Facility: CLINIC | Age: 51
Discharge: HOME | End: 2021-08-10
Attending: EMERGENCY MEDICINE | Admitting: EMERGENCY MEDICINE
Payer: COMMERCIAL

## 2021-08-10 VITALS
TEMPERATURE: 97.5 F | OXYGEN SATURATION: 98 % | SYSTOLIC BLOOD PRESSURE: 116 MMHG | HEART RATE: 75 BPM | DIASTOLIC BLOOD PRESSURE: 75 MMHG | RESPIRATION RATE: 18 BRPM

## 2021-08-10 DIAGNOSIS — K08.89 PAIN, DENTAL: ICD-10-CM

## 2021-08-10 PROCEDURE — 64400 NJX AA&/STRD TRIGEMINAL NRV: CPT | Performed by: EMERGENCY MEDICINE

## 2021-08-10 PROCEDURE — 99284 EMERGENCY DEPT VISIT MOD MDM: CPT | Mod: 25 | Performed by: EMERGENCY MEDICINE

## 2021-08-10 PROCEDURE — 99283 EMERGENCY DEPT VISIT LOW MDM: CPT | Mod: 25 | Performed by: EMERGENCY MEDICINE

## 2021-08-10 RX ORDER — OXYCODONE HYDROCHLORIDE 5 MG/1
5 TABLET ORAL EVERY 6 HOURS PRN
Qty: 6 TABLET | Refills: 0 | Status: SHIPPED | OUTPATIENT
Start: 2021-08-10 | End: 2021-08-12

## 2021-08-10 NOTE — ED PROVIDER NOTES
History     Chief Complaint   Patient presents with     Dental Pain     HPI  Sugar Bishop is a 50 year old female who Presents to the ER with dental pain.  This started 6 days ago and is located in the leftupper jaw.  Associated symptoms include tenderness in the jaw.  Pertinent negatives include: no fever, pustular drainage, facial swelling, tmj symptoms, inner ear pain, cough, congestion, runny nose.  She has caps placed on her back 2 molars which seem to be the etiology for her discomfort.  She has not had a broken teeth recently.  The scabs were placed many years ago.  She has an appointment coming up for her biannual cleaning on the 18th.        Medications tried: OTC meds without relief        Allergies:  Allergies   Allergen Reactions     Sulfa Drugs        Problem List:    Patient Active Problem List    Diagnosis Date Noted     Tendonitis 05/17/2016     Priority: Medium     right hand       Pain of right hand 05/17/2016     Priority: Medium     Contusion of right hand, initial encounter 05/17/2016     Priority: Medium     Other viral warts 07/24/2013     Priority: Medium     Diagnosis updated by automated process. Provider to review and confirm.       CARDIOVASCULAR SCREENING; LDL GOAL LESS THAN 160 10/31/2010     Priority: Medium        Past Medical History:    Past Medical History:   Diagnosis Date     Contusion of right hand, initial encounter 5/17/2016     Pain of right hand 5/17/2016     Tendonitis 5/17/2016       Past Surgical History:    Past Surgical History:   Procedure Laterality Date     HC SHLDR ARTHROSCOP,PART ACROMIOPLAS  1995    left     PELVIS LAPAROSCOPY,DX  1992     PELVIS LAPAROSCOPY,DX  1995       Family History:    Family History   Problem Relation Age of Onset     Cancer Father         ? type       Social History:  Marital Status:   [2]  Social History     Tobacco Use     Smoking status: Former Smoker     Packs/day: 0.50     Quit date: 5/1/2004     Years since quitting:  17.2     Smokeless tobacco: Never Used   Substance Use Topics     Alcohol use: Yes     Comment: Less than once/year     Drug use: No        Medications:    amoxicillin-clavulanate (AUGMENTIN) 875-125 MG tablet  oxyCODONE (ROXICODONE) 5 MG tablet  aspirin-acetaminophen-caffeine (EXCEDRIN MIGRAINE) 250-250-65 MG per tablet  FLUZONE QUADRIVALENT 0.5 ML injection          Review of Systems    Physical Exam   BP: 116/75  Pulse: 75  Temp: 97.5  F (36.4  C)  Resp: 18  SpO2: 98 %      Physical Exam  Vitals and nursing note reviewed.   Constitutional:       General: She is not in acute distress.     Appearance: Normal appearance. She is well-developed. She is not diaphoretic.   HENT:      Head: Normocephalic and atraumatic.      Right Ear: External ear normal.      Left Ear: External ear normal.      Nose: Nose normal. No congestion or rhinorrhea.      Mouth/Throat:      Comments: No edema or fluctuance of the gum lining the left upper jaw.  Caps are in place on both of the molars in that area.  No significant tenderness with palpation over the teeth themselves.  There is some tenderness over the gumline above them.  No swelling.  Eyes:      General: No scleral icterus.     Extraocular Movements: Extraocular movements intact.      Pupils: Pupils are equal, round, and reactive to light.   Cardiovascular:      Rate and Rhythm: Normal rate.   Pulmonary:      Effort: Pulmonary effort is normal.   Musculoskeletal:         General: Normal range of motion.      Cervical back: Normal range of motion and neck supple.   Skin:     General: Skin is warm and dry.      Coloration: Skin is not pale.      Findings: No erythema or rash.   Neurological:      Mental Status: She is alert and oriented to person, place, and time.   Psychiatric:         Mood and Affect: Mood normal.         Thought Content: Thought content normal.         ED Course        Procedures         After informed consent periapical dental blocks were performed of teeth 15  and 16 with excellent relief.  I used 0.5% bupivacaine with epinephrine.  There were no complications.         No results found for this or any previous visit (from the past 24 hour(s)).    Medications - No data to display    Assessments & Plan (with Medical Decision Making)  Dental pain.  Most likely periapical abscess.  The patient was started on Augmentin and given 6 tablets of oxycodone.  She was encouraged to use ice and ibuprofen and use the oxycodone if breakthrough pain.  Follow-up with dentist.  She has an appointment coming up in 8 days but I encouraged her to contact her dentist to let them know.  Return to ER precautions and follow-up precautions discussed.     I have reviewed the nursing notes.    I have reviewed the findings, diagnosis, plan and need for follow up with the patient.      Discharge Medication List as of 8/10/2021  1:09 AM      START taking these medications    Details   amoxicillin-clavulanate (AUGMENTIN) 875-125 MG tablet Take 1 tablet by mouth 2 times daily, Disp-20 tablet, R-0, [facility]      oxyCODONE (ROXICODONE) 5 MG tablet Take 1 tablet (5 mg) by mouth every 6 hours as needed for severe pain, Disp-6 tablet, R-0, [facility]             Final diagnoses:   Pain, dental       8/10/2021   [facility] EMERGENCY DEPT     Marshall Cruz MD  08/10/21 7811

## 2021-08-10 NOTE — DISCHARGE INSTRUCTIONS
Return to the emergency department if you develop new or worsening symptoms.  Take the pain pills with caution.  You cannot drive after taking them.  Start the antibiotics tonight.  Try to get in with your dentist as soon as possible.

## 2021-08-12 ENCOUNTER — OFFICE VISIT (OUTPATIENT)
Dept: FAMILY MEDICINE | Facility: CLINIC | Age: 51
End: 2021-08-12
Payer: COMMERCIAL

## 2021-08-12 VITALS
HEART RATE: 75 BPM | HEIGHT: 65 IN | SYSTOLIC BLOOD PRESSURE: 112 MMHG | TEMPERATURE: 98.5 F | OXYGEN SATURATION: 100 % | WEIGHT: 135 LBS | RESPIRATION RATE: 18 BRPM | DIASTOLIC BLOOD PRESSURE: 66 MMHG | BODY MASS INDEX: 22.49 KG/M2

## 2021-08-12 DIAGNOSIS — K08.89 PAIN, DENTAL: Primary | ICD-10-CM

## 2021-08-12 PROCEDURE — 99214 OFFICE O/P EST MOD 30 MIN: CPT | Performed by: PHYSICIAN ASSISTANT

## 2021-08-12 RX ORDER — OXYCODONE HYDROCHLORIDE 5 MG/1
TABLET ORAL
Qty: 40 TABLET | Refills: 0 | Status: SHIPPED | OUTPATIENT
Start: 2021-08-12 | End: 2021-11-18

## 2021-08-12 RX ORDER — HYDROXYZINE HYDROCHLORIDE 25 MG/1
TABLET, FILM COATED ORAL
Qty: 60 TABLET | Refills: 0 | Status: SHIPPED | OUTPATIENT
Start: 2021-08-12 | End: 2021-11-18

## 2021-08-12 ASSESSMENT — MIFFLIN-ST. JEOR: SCORE: 1237.2

## 2021-08-12 NOTE — PROGRESS NOTES
"  Lida Wooten is a 50 year old who presents for the following health issues    HPI     ED/UC Followup:    Facility:  [facility]  Date of visit: 8/10/21  Reason for visit: dental pain  Current Status: severe pain, throbbing - has a dental appointment scheduled for Wednesday 8/18/21     Patient presents today for ER follow-up. She was recently seen on 8/10/2021 for dental pain. It was suspected that her symptoms were likely related to an abscess. She was given Augmentin. She reports the provider offered her pain medication. She really did not want this but he recommended at least taking 6 tablets for breakthrough pain. She reports the pain has been very bothersome. No relief with the antibiotic. Needing pain medication every 4-6 hours for any relief. Has tried numerous OTC treatment without relief. She has never had symptoms like this before. She does have a dental visit on 8/18. Denies any fevers.     Review of Systems   Constitutional, HEENT, cardiovascular, pulmonary, gi and gu systems are negative, except as otherwise noted.      Objective    /66   Pulse 75   Temp 98.5  F (36.9  C) (Temporal)   Resp 18   Ht 1.657 m (5' 5.25\")   Wt 61.2 kg (135 lb)   LMP 07/15/2021   SpO2 100%   BMI 22.29 kg/m    Body mass index is 22.29 kg/m .  Physical Exam   GENERAL: healthy, alert and no distress  HENT: No edema or erythema of the left upper cheek/jaw. No significant tenderness. No gingival swelling.  SKIN: no suspicious lesions or rashes  PSYCH: mentation appears normal, affect normal/bright     Assessment & Plan     Pain, dental  Patient continues to have significant dental pain and no relief thus far with the antibiotic. She will continue treatment until dental visit next week. Oxycodone refills and Hydroxyzine given as well. She will continue supportive cares. We discussed that given severe pain without any visual symptoms it is important to monitor for possible shingles. She will call right away if a " rash develops.   - oxyCODONE (ROXICODONE) 5 MG tablet; Take 1 tablet every 4-6 hours as needed for severe pain  - hydrOXYzine (ATARAX) 25 MG tablet; Take 1-2 tablets with pain medication as needed up to 4 times daily    The patient indicates understanding of these issues and agrees with the plan.    Hailey Evans PA-C  [facility]

## 2021-10-12 ENCOUNTER — E-VISIT (OUTPATIENT)
Dept: FAMILY MEDICINE | Facility: CLINIC | Age: 51
End: 2021-10-12
Payer: COMMERCIAL

## 2021-10-12 DIAGNOSIS — J06.9 VIRAL URI: Primary | ICD-10-CM

## 2021-10-12 PROCEDURE — 99422 OL DIG E/M SVC 11-20 MIN: CPT | Performed by: PHYSICIAN ASSISTANT

## 2021-10-12 RX ORDER — PSEUDOEPHEDRINE HCL 60 MG
60 TABLET ORAL EVERY 4 HOURS PRN
Qty: 20 TABLET | Refills: 0 | Status: SHIPPED | OUTPATIENT
Start: 2021-10-12 | End: 2023-01-12

## 2021-10-12 NOTE — PATIENT INSTRUCTIONS
Dear Sugar Bishop    After reviewing your responses, I've been able to diagnose you with?a viral upper respiratory infection caused by COVID..?     Based on your responses and diagnosis, I have prescribed Pseudofed to treat your symptoms. I have sent this to your pharmacy.?     It is also important to stay well hydrated, get lots of rest and take over-the-counter decongestants,?tylenol?or ibuprofen if you?are able to?take those medications per your primary care provider to help relieve discomfort.?     It is important that you take?all of?your prescribed medication even if your symptoms are improving after a few doses.? Taking?all of?your medicine helps prevent the symptoms from returning.?     If your symptoms worsen, you develop severe headache, vomiting, high fever (>102), or are not improving in 7 days, please contact your primary care provider for an appointment or visit any of our convenient Walk-in Care or Urgent Care Centers to be seen which can be found on our website?here.?     Thanks again for choosing?us?as your health care partner,?   ?  Syl Sal PA-C?    The symptoms you describe suggest a viral cause, which is much more common than a bacterial cause. Antibiotics will treat bacterial infections, but have no effect on viral infections. If possible, especially if improving, start with symptom care for the first 7-10 days, then consider seeking further treatment or taking an antibiotic. Bacterial infections generally are more severe, including symptoms such as pus, fever over 101degrees F, or rapidly worsening.

## 2021-10-23 ENCOUNTER — HEALTH MAINTENANCE LETTER (OUTPATIENT)
Age: 51
End: 2021-10-23

## 2021-11-18 ENCOUNTER — OFFICE VISIT (OUTPATIENT)
Dept: FAMILY MEDICINE | Facility: CLINIC | Age: 51
End: 2021-11-18
Payer: COMMERCIAL

## 2021-11-18 VITALS
HEART RATE: 82 BPM | TEMPERATURE: 98.1 F | RESPIRATION RATE: 18 BRPM | DIASTOLIC BLOOD PRESSURE: 66 MMHG | BODY MASS INDEX: 22.01 KG/M2 | WEIGHT: 133.3 LBS | SYSTOLIC BLOOD PRESSURE: 114 MMHG | OXYGEN SATURATION: 98 %

## 2021-11-18 DIAGNOSIS — S49.91XA SHOULDER INJURY, RIGHT, INITIAL ENCOUNTER: Primary | ICD-10-CM

## 2021-11-18 PROCEDURE — 99213 OFFICE O/P EST LOW 20 MIN: CPT | Performed by: STUDENT IN AN ORGANIZED HEALTH CARE EDUCATION/TRAINING PROGRAM

## 2021-11-18 ASSESSMENT — PAIN SCALES - GENERAL: PAINLEVEL: NO PAIN (0)

## 2022-02-12 ENCOUNTER — HEALTH MAINTENANCE LETTER (OUTPATIENT)
Age: 52
End: 2022-02-12

## 2022-06-04 ENCOUNTER — HEALTH MAINTENANCE LETTER (OUTPATIENT)
Age: 52
End: 2022-06-04

## 2022-08-03 ENCOUNTER — OFFICE VISIT (OUTPATIENT)
Dept: FAMILY MEDICINE | Facility: CLINIC | Age: 52
End: 2022-08-03
Payer: COMMERCIAL

## 2022-08-03 VITALS
BODY MASS INDEX: 23.09 KG/M2 | OXYGEN SATURATION: 99 % | DIASTOLIC BLOOD PRESSURE: 78 MMHG | RESPIRATION RATE: 16 BRPM | TEMPERATURE: 97.9 F | HEIGHT: 65 IN | WEIGHT: 138.56 LBS | HEART RATE: 79 BPM | SYSTOLIC BLOOD PRESSURE: 120 MMHG

## 2022-08-03 DIAGNOSIS — F41.9 ANXIETY: Primary | ICD-10-CM

## 2022-08-03 PROCEDURE — 99214 OFFICE O/P EST MOD 30 MIN: CPT | Performed by: OBSTETRICS & GYNECOLOGY

## 2022-08-03 RX ORDER — LORATADINE 10 MG/1
10 TABLET ORAL DAILY
COMMUNITY
End: 2023-01-12

## 2022-08-03 ASSESSMENT — ANXIETY QUESTIONNAIRES
7. FEELING AFRAID AS IF SOMETHING AWFUL MIGHT HAPPEN: MORE THAN HALF THE DAYS
8. IF YOU CHECKED OFF ANY PROBLEMS, HOW DIFFICULT HAVE THESE MADE IT FOR YOU TO DO YOUR WORK, TAKE CARE OF THINGS AT HOME, OR GET ALONG WITH OTHER PEOPLE?: SOMEWHAT DIFFICULT
IF YOU CHECKED OFF ANY PROBLEMS ON THIS QUESTIONNAIRE, HOW DIFFICULT HAVE THESE PROBLEMS MADE IT FOR YOU TO DO YOUR WORK, TAKE CARE OF THINGS AT HOME, OR GET ALONG WITH OTHER PEOPLE: SOMEWHAT DIFFICULT
GAD7 TOTAL SCORE: 9
5. BEING SO RESTLESS THAT IT IS HARD TO SIT STILL: SEVERAL DAYS
7. FEELING AFRAID AS IF SOMETHING AWFUL MIGHT HAPPEN: MORE THAN HALF THE DAYS
GAD7 TOTAL SCORE: 9
4. TROUBLE RELAXING: SEVERAL DAYS
6. BECOMING EASILY ANNOYED OR IRRITABLE: SEVERAL DAYS
3. WORRYING TOO MUCH ABOUT DIFFERENT THINGS: SEVERAL DAYS
2. NOT BEING ABLE TO STOP OR CONTROL WORRYING: SEVERAL DAYS
1. FEELING NERVOUS, ANXIOUS, OR ON EDGE: MORE THAN HALF THE DAYS

## 2022-08-03 ASSESSMENT — ENCOUNTER SYMPTOMS: NERVOUS/ANXIOUS: 1

## 2022-08-03 ASSESSMENT — PAIN SCALES - GENERAL: PAINLEVEL: NO PAIN (0)

## 2022-08-03 NOTE — PROGRESS NOTES
{PROVIDER CHARTING PREFERENCE:768099}    Lida Wooten is a 51 year old{ACCOMPANIED BY STATEMENT (Optional):334380}, presenting for the following health issues:  No chief complaint on file.      Anxiety    History of Present Illness       Mental Health Follow-up:  Patient presents to follow-up on Anxiety.    Patient's anxiety since last visit has been:  Better  The patient is not having other symptoms associated with anxiety.  Any significant life events: other  Patient is not feeling anxious or having panic attacks.  Patient has no concerns about alcohol or drug use.    She eats 0-1 servings of fruits and vegetables daily.She consumes 1 sweetened beverage(s) daily.She exercises with enough effort to increase her heart rate 30 to 60 minutes per day.  She exercises with enough effort to increase her heart rate 4 days per week.   Today's KEELY-7 Score: 9       {SUPERLIST (Optional):360038}  {additonal problems for provider to add (Optional):728557}    Review of Systems   Psychiatric/Behavioral: The patient is nervous/anxious.       {ROS COMP (Optional):354996}      Objective    There were no vitals taken for this visit.  There is no height or weight on file to calculate BMI.  Physical Exam   {Exam List (Optional):361593}    {Diagnostic Test Results (Optional):311099}    {AMBULATORY ATTESTATION (Optional):022602}            .  ..

## 2022-08-03 NOTE — PROGRESS NOTES
ASSESSMENT/PLAN:                                                        1.  Anxiety related to going through a divorce.  I reminded her to take the high road whenever possible and do not return the negativity that her  is throwing at her.  I think her child will figure out who is taking the better approach in the long run.    2.  I advised her to walk at least an hour every day to help diffuse the stress.  I asked her if she wanted to try an antidepressant but she declined.  I did offer more counseling but she declined.    3.  Her brother is coming home from [place] tomorrow and she will be confiding in him as well.  She will follow-up with me if necessary.                                                                      SUBJECTIVE:                                                      She is under a lot of stress due to a divorce that she is going through currently.  She has seen a counselor through her work.  She had 8 sessions with a counselor.  She does not think she is depressed.  She is just anxious.  Wants to know if she is handling things appropriately.  She is not suicidal.  No thoughts of harming herself.  She has a 14-year-old son at home.  18-year-old child will be leaving the home.  She has court ordered joint custody of the 14-year-old.  The divorce should be finalized soon.  She is finding herself thinking a lot at night and not sleeping well because of that.  She is trying to walk more.     has been quite belittling to her.  Calling her names.  Some days she has a hard time handling it.        Patient Active Problem List   Diagnosis     CARDIOVASCULAR SCREENING; LDL GOAL LESS THAN 160     Other viral warts     Tendonitis     Pain of right hand     Contusion of right hand, initial encounter     Past Surgical History:   Procedure Laterality Date      SHLDR ARTHROSCOP,PART ACROMIOPLAS  1995    left     PELVIS LAPAROSCOPY,DX  1992     PELVIS LAPAROSCOPY,DX  1995       Social History  "    Tobacco Use     Smoking status: Former Smoker     Packs/day: 0.50     Years: 0.00     Pack years: 0.00     Quit date: 2004     Years since quittin.2     Smokeless tobacco: Never Used   Substance Use Topics     Alcohol use: Yes     Comment: rarely     Family History   Problem Relation Age of Onset     Cancer Father         ? type         Current Outpatient Medications   Medication Sig Dispense Refill     loratadine (CLARITIN) 10 MG tablet Take 10 mg by mouth daily       aspirin-acetaminophen-caffeine (EXCEDRIN MIGRAINE) 250-250-65 MG per tablet Take 2 tablets by mouth every 6 hours as needed. (Patient not taking: Reported on 2021)       pseudoePHEDrine (SUDAFED) 60 MG tablet Take 1 tablet (60 mg) by mouth every 4 hours as needed for congestion (Patient not taking: Reported on 2021) 20 tablet 0       ROS:  A 12 point systems review is negative except for what is listed above in the Subjective history.        Wt Readings from Last 3 Encounters:   22 62.9 kg (138 lb 9 oz)   21 60.5 kg (133 lb 4.8 oz)   21 61.2 kg (135 lb)                     BP Readings from Last 6 Encounters:   22 120/78   21 114/66   21 112/66   08/10/21 116/75   20 128/80   19 108/60          OBJECTIVE:                                                    Vital signs: /78 (BP Location: Left arm, Patient Position: Sitting, Cuff Size: Adult Regular)   Pulse 79   Temp 97.9  F (36.6  C) (Temporal)   Resp 16   Ht 1.657 m (5' 5.25\")   Wt 62.9 kg (138 lb 9 oz)   LMP 2022 (Approximate)   SpO2 99%   BMI 22.88 kg/m          A total of 35 minutes were spent in today's visit including the time spent with  the patient in addition to the time spent just prior to the visit reviewing the chart  and then charting afterwards on this patient today.      Justin Saleh MD  [facility]       The above information was dictating using Dragon voice " software and as a result there may be some irregularities that were not detected in my review of this note.

## 2022-10-09 ENCOUNTER — HEALTH MAINTENANCE LETTER (OUTPATIENT)
Age: 52
End: 2022-10-09

## 2023-01-04 ENCOUNTER — HOSPITAL ENCOUNTER (EMERGENCY)
Facility: CLINIC | Age: 53
Discharge: HOME | End: 2023-01-04
Attending: PHYSICIAN ASSISTANT | Admitting: PHYSICIAN ASSISTANT

## 2023-01-04 ENCOUNTER — APPOINTMENT (OUTPATIENT)
Dept: CT IMAGING | Facility: CLINIC | Age: 53
End: 2023-01-04
Attending: PHYSICIAN ASSISTANT

## 2023-01-04 VITALS
OXYGEN SATURATION: 100 % | BODY MASS INDEX: 22.29 KG/M2 | SYSTOLIC BLOOD PRESSURE: 129 MMHG | HEART RATE: 63 BPM | WEIGHT: 135 LBS | RESPIRATION RATE: 16 BRPM | DIASTOLIC BLOOD PRESSURE: 68 MMHG | TEMPERATURE: 97.1 F

## 2023-01-04 DIAGNOSIS — M62.830 LUMBAR PARASPINAL MUSCLE SPASM: ICD-10-CM

## 2023-01-04 DIAGNOSIS — S33.5XXA LUMBAR SPRAIN: ICD-10-CM

## 2023-01-04 PROCEDURE — 250N000013 HC RX MED GY IP 250 OP 250 PS 637: Performed by: PHYSICIAN ASSISTANT

## 2023-01-04 PROCEDURE — 99284 EMERGENCY DEPT VISIT MOD MDM: CPT | Mod: 25 | Performed by: PHYSICIAN ASSISTANT

## 2023-01-04 PROCEDURE — 99284 EMERGENCY DEPT VISIT MOD MDM: CPT | Performed by: PHYSICIAN ASSISTANT

## 2023-01-04 PROCEDURE — 72131 CT LUMBAR SPINE W/O DYE: CPT

## 2023-01-04 RX ORDER — METHOCARBAMOL 500 MG/1
500 TABLET, FILM COATED ORAL 4 TIMES DAILY PRN
Qty: 30 TABLET | Refills: 0 | Status: SHIPPED | OUTPATIENT
Start: 2023-01-04 | End: 2023-01-12

## 2023-01-04 RX ORDER — METHOCARBAMOL 500 MG/1
500 TABLET, FILM COATED ORAL ONCE
Status: COMPLETED | OUTPATIENT
Start: 2023-01-04 | End: 2023-01-04

## 2023-01-04 RX ORDER — CYCLOBENZAPRINE HCL 10 MG
5-10 TABLET ORAL 3 TIMES DAILY PRN
Qty: 15 TABLET | Refills: 0 | Status: SHIPPED | OUTPATIENT
Start: 2023-01-04 | End: 2023-01-12

## 2023-01-04 RX ORDER — HYDROCODONE BITARTRATE AND ACETAMINOPHEN 5; 325 MG/1; MG/1
.5-1 TABLET ORAL EVERY 6 HOURS PRN
Qty: 6 TABLET | Refills: 0 | Status: SHIPPED | OUTPATIENT
Start: 2023-01-04 | End: 2024-05-01

## 2023-01-04 RX ADMIN — METHOCARBAMOL 500 MG: 500 TABLET ORAL at 20:51

## 2023-01-04 ASSESSMENT — ACTIVITIES OF DAILY LIVING (ADL): ADLS_ACUITY_SCORE: 35

## 2023-01-04 NOTE — LETTER
January 4, 2023      To Whom It May Concern:      Sugar Bishop was seen in our Emergency Department today, 01/04/23.  I expect her condition to improve over the next 1-2 weeks.  She may return to work when improved.  Please excuse her from work today and for the next 1 week due to her inability to bend, twist, or left due to her back injury.      Sincerely,            Paco Garcia PA-C

## 2023-01-05 ENCOUNTER — TELEPHONE (OUTPATIENT)
Dept: FAMILY MEDICINE | Facility: CLINIC | Age: 53
End: 2023-01-05

## 2023-01-05 NOTE — TELEPHONE ENCOUNTER
Reason for Call:  Other appointment    Detailed comments: Pt was hoping to schedule an ER follow up with any provider available next week.  She was seen for her lower back the night of 1/4.    Phone Number Patient can be reached at: Home number on file [phone] (home)    Best Time: anytime    Can we leave a detailed message on this number? YES    Call taken on 1/5/2023 at 10:41 AM by Kathrine Curry

## 2023-01-05 NOTE — ED TRIAGE NOTES
Patient c/o low back pain since lifting hay anne on Monday.     Triage Assessment     Row Name 01/04/23 1937       Triage Assessment (Adult)    Airway WDL WDL       Respiratory WDL    Respiratory WDL WDL       Skin Circulation/Temperature WDL    Skin Circulation/Temperature WDL WDL

## 2023-01-05 NOTE — DISCHARGE INSTRUCTIONS
It was a pleasure working with you today!  I hope your condition improves rapidly!     Please REST!!  Please do not bend, twist, or lift anything for the next 1 week.  Please try heat on your back for 20 minutes every 1-2 hours.  Take ibuprofen 600 mg every 6 hours with food as needed for inflammation and pain.  You can also take Tylenol 1000 mg every 6 hours as needed for pain.  Reserve the hydrocodone to use for severe breakthrough pain.  Do not drive for 8 hours after taking hydrocodone, as this medication can impair your judgment.  Try the muscle relaxer, cyclobenzaprine, 1/2 tablet every 8 hours as needed.  If you do not like how this medication makes you feel, you could fill the methocarbamol prescription at [facility] pharmacy tomorrow to take instead of the cyclobenzaprine.  Please follow-up in the clinic in 1 week to recheck your condition.

## 2023-01-05 NOTE — ED PROVIDER NOTES
History     Chief Complaint   Patient presents with     Back Pain     HPI  Sugar Bishop is a 52 year old female who presents for evaluation of low back pain.  Injury occurred 2 days ago.  She was pushing on a hay bale above her head when the hay bale rolled her when she had to apply significant amount of pressure upwards.  She felt a compression into the low back and she also tilted somewhat to the left.  She felt a crunch in her back.  She had minimal discomfort that evening, but the following afternoon she had onset of pain and muscle spasm.  Difficulties with movement.  Worse when she tries to flex or extend her spine.  No radicular pain.  Denies any numbness/tingling/weakness of the lower extremities.  No saddle anesthesia.  No loss of bowel/bladder function.  No fevers or chills.  She has attempted treatment with ibuprofen and Tylenol with minimal improvement.  Pain is currently rated 7-8 on a scale of 10.  Worse with walking.  She is here with her significant other.    Allergies:  Allergies   Allergen Reactions     Sulfa Drugs        Problem List:    Patient Active Problem List    Diagnosis Date Noted     Tendonitis 05/17/2016     Priority: Medium     right hand       Pain of right hand 05/17/2016     Priority: Medium     Contusion of right hand, initial encounter 05/17/2016     Priority: Medium     Other viral warts 07/24/2013     Priority: Medium     Diagnosis updated by automated process. Provider to review and confirm.       CARDIOVASCULAR SCREENING; LDL GOAL LESS THAN 160 10/31/2010     Priority: Medium        Past Medical History:    Past Medical History:   Diagnosis Date     Contusion of right hand, initial encounter 5/17/2016     Pain of right hand 5/17/2016     Tendonitis 5/17/2016       Past Surgical History:    Past Surgical History:   Procedure Laterality Date      SHLDR ARTHROSCOP,PART ACROMIOPLAS  1995    left     PELVIS LAPAROSCOPY,DX  1992     PELVIS LAPAROSCOPY,DX  1995       Family  History:    Family History   Problem Relation Age of Onset     Cancer Father         ? type       Social History:  Marital Status:   [2]  Social History     Tobacco Use     Smoking status: Former     Packs/day: 0.50     Years: 0.00     Pack years: 0.00     Types: Cigarettes     Quit date: 2004     Years since quittin.6     Smokeless tobacco: Never   Substance Use Topics     Alcohol use: Yes     Comment: rarely     Drug use: No        Medications:    cyclobenzaprine (FLEXERIL) 10 MG tablet  HYDROcodone-acetaminophen (NORCO) 5-325 MG tablet  methocarbamol (ROBAXIN) 500 MG tablet  aspirin-acetaminophen-caffeine (EXCEDRIN MIGRAINE) 250-250-65 MG per tablet  loratadine (CLARITIN) 10 MG tablet  pseudoePHEDrine (SUDAFED) 60 MG tablet          Review of Systems   All other systems reviewed and are negative.      Physical Exam   BP: 129/68  Pulse: 63  Temp: 97.1  F (36.2  C)  Resp: 16  Weight: 61.2 kg (135 lb)  SpO2: 100 %      Physical Exam  Vitals and nursing note reviewed.   Constitutional:       General: She is not in acute distress.     Appearance: She is not diaphoretic.   HENT:      Head: Normocephalic and atraumatic.      Right Ear: External ear normal.      Left Ear: External ear normal.      Nose: Nose normal.      Mouth/Throat:      Pharynx: No oropharyngeal exudate.   Eyes:      General: No scleral icterus.        Right eye: No discharge.         Left eye: No discharge.      Conjunctiva/sclera: Conjunctivae normal.      Pupils: Pupils are equal, round, and reactive to light.   Neck:      Thyroid: No thyromegaly.   Cardiovascular:      Rate and Rhythm: Normal rate and regular rhythm.      Heart sounds: Normal heart sounds. No murmur heard.  Pulmonary:      Effort: Pulmonary effort is normal. No respiratory distress.      Breath sounds: Normal breath sounds. No wheezing or rales.   Chest:      Chest wall: No tenderness.   Abdominal:      General: Bowel sounds are normal. There is no distension.       Palpations: Abdomen is soft. There is no mass.      Tenderness: There is no abdominal tenderness. There is no guarding or rebound.   Musculoskeletal:      Cervical back: Normal range of motion and neck supple.      Comments: Lumbosacral spine area reveals no mass but there is tenderness of the paravertebral muscles at about L4/5. Limited and painful lumbosacral range of motion is noted. Straight leg raise is negative at 45 degrees on both sides. DTR's, motor strength and sensation normal, including heel and toe gait.  Peripheral pulses are palpable. Hips and knees have full range of motion without pain. No abdominal tenderness, mass or organomegaly.     Lymphadenopathy:      Cervical: No cervical adenopathy.   Skin:     General: Skin is warm and dry.      Capillary Refill: Capillary refill takes less than 2 seconds.      Findings: No erythema or rash.   Neurological:      Mental Status: She is alert and oriented to person, place, and time.      Cranial Nerves: No cranial nerve deficit.   Psychiatric:         Behavior: Behavior normal.         Thought Content: Thought content normal.         ED Course                 Procedures              Critical Care time:  none               Results for orders placed or performed during the hospital encounter of 01/04/23 (from the past 24 hour(s))   Lumbar spine CT w/o contrast    Narrative    EXAM: CT LUMBAR SPINE W/O CONTRAST  LOCATION: [facility]  DATE/TIME: 1/4/2023 9:08 PM    INDICATION: L4 5 pain injury, concern for compression fracture vs other  COMPARISON: None.  TECHNIQUE: Routine CT Lumbar Spine without IV contrast. Multiplanar reformats. Dose reduction techniques were used.    FINDINGS:  Nomenclature is based on 5 lumbar type vertebral bodies. Normal vertebral body heights. Mild levocurvature, measures 20 degrees, apex at L3-L4. No destructive bony lesion. No extraspinal soft tissue abnormality. Unremarkable visualized bony  pelvis.    T12-L1: Normal disc height. No herniation. Normal facets. No spinal canal or neural foraminal stenosis.    L1-L2: Normal disc height. No herniation. Normal facets. No spinal canal or neural foraminal stenosis.    L2-L3: Normal disc height. No herniation. Normal facets. No spinal canal or neural foraminal stenosis.    L3-L4: Normal disc height. Shallow disc bulge. Normal facets. No spinal canal or neural foraminal stenosis.    L4-L5: Normal disc height. Shallow disc bulge. Normal facets. No spinal canal or neural foraminal stenosis.    L5-S1: Normal disc height. Shallow disc bulge. Normal facets. No spinal canal or neural foraminal stenosis.      Impression    IMPRESSION:  1.  No acute findings.  2.  There are mild disc bulges at several levels without significant central or foraminal stenosis.       Medications   methocarbamol (ROBAXIN) tablet 500 mg (500 mg Oral Given 1/4/23 2051)       Assessments & Plan (with Medical Decision Making)  1. Lumbar sprain    2. Lumbar paraspinal muscle spasm         52 year old female with no prior low back history who presents for evaluation of pain and muscle spasm in the low back without radicular symptoms.  No red flag symptoms.  Supposedly injury occurred 2 days ago when she had a heavy hay bale come back towards her when she had her arms above her head.  She felt a crack/crunch in her low back at that time.  No prior history of compression fracture.  No history of osteopenia that she is aware of.  Pain is currently rated 7-8 on a scale of 10.  Muscle spasm noted.  On exam blood pressure 129/68, temperature 97.1, pulse 63, respirations 16, oxygen saturation 100% on room air.  Patient appears to be in discomfort.  Pain with any range of motion of the back.  Tender to palpation at the L4/5 spinous process and paravertebral muscular area.  Muscle spasm noted.  Lower extremity strength, sensation, and range of motion normal.  Distal pulses 2+.  Remainder of the exam is  otherwise negative.  She was given a small dose of methocarbamol here in the ED.  CT of the lumbar spine displays no acute fracture.  No acute foraminal stenosis.  Reassuring findings.  Discussed lumbar sprain with the patient.  Rest recommended.  No bending, lifting, or twisting recommended.  Note provided for work.  She should not be working at this time.  Ibuprofen and Tylenol dosing discussed.  Heat to be applied for 20 minutes every hour.  Gentle stretching when she starts to improve.  She wants to be on the lowest possible dose of muscle relaxer if possible.   unfortunately, we do not have methocarbamol in the [email or web address] [facility] machine here in the ED given that it is after hours.  I will give her a prescription for cyclobenzaprine to manage overnight.  If this causes too much sedation or if she would prefer the methocarbamol, then she could fill methocarbamol instead tomorrow at the outpatient pharmacy.  Do not use these medications together.  Hydrocodone can be used for breakthrough pain.  Start with just a half a tablet.  Do not drive for 8 hours after taking this.  Follow-up with primary care in 1 week to recheck her condition.  Patient was in agreement this plan and was suitable for discharge.     I have reviewed the nursing notes.    I have reviewed the findings, diagnosis, plan and need for follow up with the patient.       Medical Decision Making  The patient presented with a problem that is an acute complicated injury.    The patient's evaluation involved:  ordering and review of 1 test(s) (see separate area of note for details)    The patient's management involved prescription drug management.        New Prescriptions    CYCLOBENZAPRINE (FLEXERIL) 10 MG TABLET    Take 0.5-1 tablets (5-10 mg) by mouth 3 times daily as needed for muscle spasms    HYDROCODONE-ACETAMINOPHEN (NORCO) 5-325 MG TABLET    Take 0.5-1 tablets by mouth every 6 hours as needed for severe pain (7-10)    METHOCARBAMOL (ROBAXIN) 500 MG  TABLET    Take 1 tablet (500 mg) by mouth 4 times daily as needed for muscle spasms       Final diagnoses:   Lumbar sprain   Lumbar paraspinal muscle spasm     Disclaimer: This note consists of symbols derived from keyboarding, dictation and/or voice recognition software. As a result, there may be errors in the script that have gone undetected. Please consider this when interpreting information found in this chart.      1/4/2023   [facility] EMERGENCY DEPT     Paco Garcia PA-C  01/04/23 8544

## 2023-01-06 ENCOUNTER — TELEPHONE (OUTPATIENT)
Dept: FAMILY MEDICINE | Facility: CLINIC | Age: 53
End: 2023-01-06

## 2023-01-06 ENCOUNTER — MYC MEDICAL ADVICE (OUTPATIENT)
Dept: FAMILY MEDICINE | Facility: CLINIC | Age: 53
End: 2023-01-06

## 2023-01-06 NOTE — TELEPHONE ENCOUNTER
Patient is calling and stated she sent a My Chart message but has not heard back from any provider at this time.    Patient stated as per documentation in My Chart message sent earlier today/ below.  Advised patient to seek emergency care for worsening symptoms.  Patient stated understanding.    Will forward to Provider for review.            Subject Delivery           Muscle spasms   1/6/2023 10:36 AM Reply    To: [employer] MESSAGES      From: Sugar Brianariana      Created: 1/6/2023 10:36 AM        *-*-*This message has not been handled.*-*-*    I was in the ER Tuesday evening (1/3/23) for a low back injury and saw Paco Garcia.  He gave me an RX for  Norco, flexeril 5-10 mg as well as Methocarbamol 500mg I have not noticed any relief from pain or spasms. I ve been laying on a heating pad, have tried ibuprofen, and still no relief.  Any other suggestions to help with the pain and spasms?              Hattie Campa RN

## 2023-01-12 ENCOUNTER — OFFICE VISIT (OUTPATIENT)
Dept: FAMILY MEDICINE | Facility: CLINIC | Age: 53
End: 2023-01-12

## 2023-01-12 VITALS
TEMPERATURE: 98.1 F | OXYGEN SATURATION: 100 % | WEIGHT: 139.8 LBS | SYSTOLIC BLOOD PRESSURE: 98 MMHG | DIASTOLIC BLOOD PRESSURE: 72 MMHG | BODY MASS INDEX: 23.29 KG/M2 | HEIGHT: 65 IN | HEART RATE: 89 BPM | RESPIRATION RATE: 16 BRPM

## 2023-01-12 DIAGNOSIS — M54.50 ACUTE MIDLINE LOW BACK PAIN WITHOUT SCIATICA: Primary | ICD-10-CM

## 2023-01-12 PROCEDURE — 99213 OFFICE O/P EST LOW 20 MIN: CPT | Performed by: STUDENT IN AN ORGANIZED HEALTH CARE EDUCATION/TRAINING PROGRAM

## 2023-01-12 RX ORDER — METHOCARBAMOL 500 MG/1
1000 TABLET, FILM COATED ORAL 4 TIMES DAILY PRN
Qty: 30 TABLET | Refills: 0 | Status: SHIPPED | OUTPATIENT
Start: 2023-01-12 | End: 2023-01-22

## 2023-01-12 NOTE — LETTER
January 12, 2023      Sugar Bishop  [address]   [place] [address]        To Whom It May Concern:    Sugar Bishop was seen in our clinic. Please excuse from work until 1/19/23. Ok to return to work after with 10 pound lifting restrictions until 2/2/23.       Sincerely,        Ronnie Tavares MD

## 2023-01-12 NOTE — PROGRESS NOTES
Assessment & Plan     Acute midline low back pain without sciatica  Acute low back pain secondary to injury.  No red flag symptoms currently.  Things slowly improving.  We will have her do 1000 mg methocarbamol as needed as this has been helpful.  We will continue with ibuprofen or transition to naproxen for longer acting relief.  We discussed physical therapy but she would like to work on home exercise and stretches by herself.  If things not improving I would have her follow-up with physical therapy.  If any new or worsening symptoms develop I would have her follow-up and consider imaging.  - methocarbamol (ROBAXIN) 500 MG tablet  Dispense: 30 tablet; Refill: 0         MED REC REQUIRED  Post Medication Reconciliation Status:    Ronnie Tavares MD  [facility] BESSIE Wooten is a 52 year old accompanied by her son, presenting for the following health issues:  ER F/U      HPI     ED/UC Followup:    Facility:  [facility]  Date of visit: 01/04/2023  Reason for visit: Back pain  Current Status: Still having pain, 5-6/10 on pain scale, states it's slowly getting better, patient took Robaxin 750 mg and states that worked better    Patient with acute injury over 1 week ago when moving around well valadez.  Cannot immediately strained her back.  Did not have a significant pain but the next 1 to 2 days develops severe pain and stiffness into her lower back.  Nothing radiating down her leg.  Appears to be midline.  No loss of bowel or bladder control.  No fevers.  She does note things improving since initial injury.  Still have a lot of spasm and difficulty with laying down.  She is being very active and this has really slowed her down.  She did note methocarbamol was somewhat helpful but did not take oxycodone as she wanted to avoid medications.  Has done ibuprofen which has been somewhat helpful as well in the last several days    Review of Systems   Constitutional,  "HEENT, cardiovascular, pulmonary, gi and gu systems are negative, except as otherwise noted.      Objective    BP 98/72 (BP Location: Left arm, Patient Position: Sitting, Cuff Size: Adult Regular)   Pulse 89   Temp 98.1  F (36.7  C) (Temporal)   Resp 16   Ht 1.657 m (5' 5.25\")   Wt 63.4 kg (139 lb 12.8 oz)   LMP 01/01/2023 (Exact Date)   SpO2 100%   BMI 23.09 kg/m    Body mass index is 23.09 kg/m .  Physical Exam   GENERAL: healthy, alert and no distress  EYES: Eyes grossly normal to inspection, PERRL and conjunctivae and sclerae normal  RESP: Breathing comfortably on room air  CV: No lower extremity edema  MS: no gross musculoskeletal defects noted, no edema, low back with midline tenderness to palpation.  Difficulty with range of motion of the low back.  Does have positive straight leg on left.  NEURO: Normal strength and tone but limited with pain, reflexes at patella 2+, mentation intact and speech normal  PSYCH: mentation appears normal, affect normal/bright            "

## 2023-01-22 ENCOUNTER — MYC REFILL (OUTPATIENT)
Dept: FAMILY MEDICINE | Facility: CLINIC | Age: 53
End: 2023-01-22

## 2023-01-22 DIAGNOSIS — M54.50 ACUTE MIDLINE LOW BACK PAIN WITHOUT SCIATICA: ICD-10-CM

## 2023-01-23 ENCOUNTER — TELEPHONE (OUTPATIENT)
Dept: FAMILY MEDICINE | Facility: CLINIC | Age: 53
End: 2023-01-23

## 2023-01-23 NOTE — LETTER
January 25, 2023      Sugar Bishop  [address]   [place] [address]        To Whom It May Concern:    Sugar Bishop was seen in our clinic. Please excuse from work until 1/19/23. Ok to return to work after with 10 pound lifting restriction without frequent bending, pushing or pulling until 2/2/23.     Sincerely,        Ronnie Tavares MD

## 2023-01-23 NOTE — TELEPHONE ENCOUNTER
Patient calling in regards to work note:    10 # weight restriction is accepted by work.    Work is needing to know if patient can BEND, TWIST, PUSH or PULL    Can you please provide a letter to patients MyChart and she will print for work.    Priyanka Grace XRO/

## 2023-01-24 NOTE — TELEPHONE ENCOUNTER
Routing refill request to provider for review/approval because:    Requested Prescriptions   Pending Prescriptions Disp Refills    methocarbamol (ROBAXIN) 500 MG tablet 30 tablet 0     Sig: Take 2 tablets (1,000 mg) by mouth 4 times daily as needed for muscle spasms       There is no refill protocol information for this order

## 2023-01-29 RX ORDER — METHOCARBAMOL 500 MG/1
1000 TABLET, FILM COATED ORAL 4 TIMES DAILY PRN
Qty: 30 TABLET | Refills: 0 | Status: SHIPPED | OUTPATIENT
Start: 2023-01-29 | End: 2024-05-01

## 2023-03-25 ENCOUNTER — HEALTH MAINTENANCE LETTER (OUTPATIENT)
Age: 53
End: 2023-03-25

## 2023-06-10 ENCOUNTER — HEALTH MAINTENANCE LETTER (OUTPATIENT)
Age: 53
End: 2023-06-10

## 2024-04-21 NOTE — PROGRESS NOTES
Assessment & Plan     Shoulder injury, right, initial encounter  Did discuss possible x-ray today but given the chronicity of her symptoms and exam consistent with rotator cuff tendinopathy. We will have her work with physical therapy and see how things go. If things not improving then I would obtain further imaging. Can use NSAIDs and ice as needed.  - Physical Therapy Referral        Return if symptoms worsen or fail to improve.    Ronnie Tavares MD  [facility]    Lida Wooten is a 51 year old who presents for the following health issues     HPI     Concern - shoulder pain  Onset: 1 year   Description: right shoulder pain, injured at home working   Intensity: mild, moderate  Progression of Symptoms:  worsening  Accompanying Signs & Symptoms: none  Previous history of similar problem: none   Precipitating factors:        Worsened by: lifting, any pressure, dressing   Therapies tried and outcome: ibuprofen, tylenol- no relief     Has dealt with discomfort on and off for the last year. No specific injury that she notes when things first started. Does think things have been worse the last month. She is still able to function normally in her day-to-day. Pain with overhead work. No recent injuries that she knows of.    Review of Systems   Constitutional, HEENT, cardiovascular, pulmonary, gi and gu systems are negative, except as otherwise noted.      Objective    /66 (BP Location: Right arm, Patient Position: Sitting, Cuff Size: Adult Regular)   Pulse 82   Temp 98.1  F (36.7  C) (Temporal)   Resp 18   Wt 60.5 kg (133 lb 4.8 oz)   SpO2 98%   BMI 22.01 kg/m    Body mass index is 22.01 kg/m .  Physical Exam   GENERAL: healthy, alert and no distress  EYES: Eyes grossly normal to inspection, PERRL and conjunctivae and sclerae normal  HENT: hearing grossly intact  RESP: lungs clear to auscultation - no rales, rhonchi or wheezes  CV: regular rate and rhythm, normal S1 S2, no  S3 or S4, no murmur, click or rub, no peripheral edema and peripheral pulses strong  MS: no gross musculoskeletal defects noted, no bicep tenderness. Does have some anterior AC joint tenderness to palpation. No scapular spine tenderness. No significant AC separation noted on exam. Has pain with internal rotation. Less pain with external rotation. Pain with empty can. ROM largely intact with some limited abduction. Negative Neer's and positive Uriostegui.  SKIN: no suspicious lesions or rashes  NEURO: Normal strength and tone, mentation intact and speech normal  PSYCH: mentation appears normal, affect normal/bright         Orbital.../Buccal...

## 2024-04-30 NOTE — PATIENT INSTRUCTIONS
If you have labs or imaging done, the results will automatically release in Identify without an interpretation.  Your health care professional will review those results and send an interpretation with recommendations as soon as possible, but this may be 1-3 business days.    If you have any questions regarding your visit, please contact your care team.     [facility]: [phone]  [facility] CLINIC HOURS TELEPHONE NUMBER   Iliana Arnett, KEVEN Navarro-MAR Ness-MAR Pineda-Surgery Scheduler  Megan-       Monday- [place]  8:00 am-4:00 pm    Tuesday- [place]  8:00 am-4:00 pm    Wednesday- [place] 8:00 am-4:00 pm    Thursday- [place] 8:00 am-4:00 pm    Friday- [place]  8:00 am-4:00 pm [facility]  [address]  [place] [address]  PH: [phone]  Fax: [phone]    Imaging Scheduling all locations  PH: [phone]     [facility]  [address] [place] [address] [address].  [place], [place] [address] [phone]    [facility]  [address] [place] [address]  PH: [phone]     **Surgeries** Our Surgery Schedulers will contact you to schedule. If you do not receive a call within 3 business days, please call [phone].  Urgent Care locations:  [facility]       Monday-Friday   10 am - 8 pm    Saturday and Sunday   9 am - 5 pm   [phone]   If you need a medication refill, please contact your pharmacy. Please allow 3 business days for your refill to be completed.  As always, Thank you for trusting us with your healthcare needs!

## 2024-05-01 ENCOUNTER — OFFICE VISIT (OUTPATIENT)
Dept: OBGYN | Facility: CLINIC | Age: 54
End: 2024-05-01
Payer: COMMERCIAL

## 2024-05-01 VITALS
OXYGEN SATURATION: 98 % | SYSTOLIC BLOOD PRESSURE: 120 MMHG | DIASTOLIC BLOOD PRESSURE: 74 MMHG | BODY MASS INDEX: 24.11 KG/M2 | HEART RATE: 83 BPM | WEIGHT: 144.7 LBS | HEIGHT: 65 IN

## 2024-05-01 DIAGNOSIS — Z12.4 CERVICAL CANCER SCREENING: ICD-10-CM

## 2024-05-01 DIAGNOSIS — Z12.11 COLON CANCER SCREENING: ICD-10-CM

## 2024-05-01 DIAGNOSIS — N95.1 PERIMENOPAUSE: ICD-10-CM

## 2024-05-01 DIAGNOSIS — Z01.419 WELL WOMAN EXAM WITH ROUTINE GYNECOLOGICAL EXAM: Primary | ICD-10-CM

## 2024-05-01 DIAGNOSIS — Z13.220 SCREENING FOR LIPID DISORDERS: ICD-10-CM

## 2024-05-01 LAB
CHOLEST SERPL-MCNC: 224 MG/DL
FASTING STATUS PATIENT QL REPORTED: NO
HDLC SERPL-MCNC: 50 MG/DL
LDLC SERPL CALC-MCNC: 129 MG/DL
NONHDLC SERPL-MCNC: 174 MG/DL
TRIGL SERPL-MCNC: 225 MG/DL

## 2024-05-01 PROCEDURE — 87624 HPV HI-RISK TYP POOLED RSLT: CPT

## 2024-05-01 PROCEDURE — 80061 LIPID PANEL: CPT

## 2024-05-01 PROCEDURE — 99459 PELVIC EXAMINATION: CPT

## 2024-05-01 PROCEDURE — 99213 OFFICE O/P EST LOW 20 MIN: CPT | Mod: 25

## 2024-05-01 PROCEDURE — 36415 COLL VENOUS BLD VENIPUNCTURE: CPT

## 2024-05-01 PROCEDURE — 99396 PREV VISIT EST AGE 40-64: CPT

## 2024-05-01 PROCEDURE — G0145 SCR C/V CYTO,THINLAYER,RESCR: HCPCS

## 2024-05-01 NOTE — PROGRESS NOTES
Well Woman Exam Note    SUBJECTIVE:  Sugar Bishop is a 53 year old female  who presents today for her annual exam.    Concerns today include:     Menopause: patient is wondering why she is still getting her period. Reports periods are still every ~30 days but bleeding fluctuates between periods - some periods are heavier than others but they do appear to be cyclical. She denies any menopause s/sx except she does feel some temperature fluctuations between hot and cold overnight x many years.    She has no bowel/bladder concerns today    Menstrual History: Reports monthly periods. See HPI      8/3/2022     3:30 PM 2023     7:47 PM 2024     9:00 AM   Menstrual History   LAST MENSTRUAL PERIOD 2022     Sexual history: Declines STI screening today    Mental Health: Reports some increased anxiety lately because she recently found out her live-in boyfriend is snorting Meth and driving under the influence of substances and alcohol. She wants him to move out but reports that he is unwilling to. She has gotten the police involved and the drug task force and feels they are working together to build a case to force him out of the house. She reports she can't make him leave because he has squatters rights and his drivers license states their house as his address. She is unwilling to move because it is her house she solely pays for. She denies feeling worried or threatened for her personal safety. She declines having a  reach out at this time. I strongly encouraged her to reach out and call police if she is physically threatened or assaulted. She understands and will do that.    Pap obtained today:  Yes    Mammogram current: yes  Last Mammogram: No results found.     Colonoscopy: Ordered today    Lipids: Check today    Diabetes Screening: Declines check    DEXA scan: N/A    HISTORY:    Allergies   Allergen Reactions    Sulfa Antibiotics      Immunization History    Administered Date(s) Administered    COVID-19 Bivalent 12+ (Pfizer) 10/08/2022    COVID-19 Monovalent 12+ (Pfizer ) 2022    DTaP, Unspecified 10/24/2012    Hepatitis B, Adult 2022, 2022, 2023    Influenza (IIV3) PF 10/23/1997, 10/07/1998, 2000, 10/24/2007, 10/21/2008, 2009, 10/06/2010, 10/10/2011, 2012    Influenza Vaccine >6 months,quad, PF 2013, 2014, 2015, 2016, 10/12/2017, 10/23/2018, 10/10/2019, 2020    Influenza Vaccine IM Ages 6-35 Months 4 Valent (PF) 2013    TD,PF 7+ (Tenivac) 2004    TDAP Vaccine (Boostrix) 10/24/2012       OB History    Para Term  AB Living   1 1 1 0 0 1   SAB IAB Ectopic Multiple Live Births   0 0 0 0 1      # Outcome Date GA Lbr Messi/2nd Weight Sex Type Anes PTL Lv   1 Term 2008    M Vag-Spont   CARL      Past Medical History:   Diagnosis Date    Contusion of right hand, initial encounter 2016    Pain of right hand 2016    Tendonitis 2016    right hand      Past Surgical History:   Procedure Laterality Date    HC SHLDR ARTHROSCOP,PART ACROMIOPLAS      left    PELVIS LAPAROSCOPY,DX      PELVIS LAPAROSCOPY,DX       Family History   Problem Relation Age of Onset    Cancer Father         ? type     Social History     Socioeconomic History    Marital status:      Spouse name: None    Number of children: 2    Years of education: None    Highest education level: None   Tobacco Use    Smoking status: Former     Current packs/day: 0.00     Types: Cigarettes     Start date: 2004     Quit date: 2004     Years since quittin.0    Smokeless tobacco: Never   Vaping Use    Vaping status: Never Used   Substance and Sexual Activity    Alcohol use: Yes     Comment: rarely    Drug use: No    Sexual activity: Yes     Partners: Male     Birth control/protection: None   Other Topics Concern    Parent/sibling w/ CABG, MI or angioplasty before 65F 55M? No  "      REVIEW of SYSTEMS:  ROS: 10 point ROS neg other than the symptoms noted above in the HPI.     EXAM:  Blood pressure 120/74, pulse 83, height 1.657 m (5' 5.24\"), weight 65.6 kg (144 lb 11.2 oz), last menstrual period 04/22/2024, SpO2 98%, not currently breastfeeding. Body mass index is 23.91 kg/m .  General - pleasant female in no acute distress.  Skin - no suspicious lesions or rashes  EENT-  PERRLA, euthyroid with out palpable nodules  Neck - supple without lymphadenopathy.  Lungs - clear to auscultation bilaterally.  Heart - regular rate and rhythm without murmur.  Abdomen - soft, nontender, nondistended, no masses or organomegaly noted.  Musculoskeletal - no gross deformities.  Neurological - normal strength, sensation, and mental status.    Breast Exam:  Breasts: normal without suspicious masses, skin changes or axillary nodes, symmetric fibrous changes in both upper outer quadrants, self exam is taught and encouraged.     Pelvic Exam:  EG/BUS: Normal genital architecture without lesions, erythema or abnormal secretions Bartholin's, Urethra, [place]'s normal   Urethral meatus: normal   Urethra: no masses, tenderness, or scarring   Bladder: no masses or tenderness   Vagina: moist, pink, rugae with creamy, white, and odorless  secretions  Cervix: Multiparous,, no lesions, and friable with pap  Uterus: anteverted,   Adnexa: Within normal limits and No masses, nodularity, tenderness  Rectum:anus normal     ASSESSMENT/PLAN:  (Z01.419) Well woman exam with routine gynecological exam  (primary encounter diagnosis)  Comment: Additional teaching done at this visit included: self breast exam, exercise, birth control, perimenopause, mental health, weight/diet, colonoscopy, mammography, and lipids; Discussed with patient risks/ benefits and treatment options of prescribed medications or other treatment modalities; RTC in one year for annual exam or with concerns.   Plan: Pap Screen with HPV - recommended age 30 - 65     "     years, Colonoscopy Screening          Referral, Lipid panel reflex to direct LDL         Non-fasting          (Z12.4) Cervical cancer screening  Plan: Pap Screen with HPV - recommended age 30 - 65         years          (Z13.220) Screening for lipid disorders  Plan: Lipid panel reflex to direct LDL Non-fasting          (Z12.11) Colon cancer screening  Plan: Colonoscopy Screening  Referral          (N95.1) Perimenopause  Plan: Discussed pathophysiology of menopause and how the pituitary gland controls ovarian function with FSH. We discussed how menopause is the cessation of ovarian ovulation and significant estrogen production. I explained that VSM such as hot flashes and night sweats are in response to hypo-estrogenism. Discussed lack of estrogen also contributing to changes in the vagina, such as atrophy and dryness. Discussed role of HT and vaginal lubrication for comfort in intercourse. Discussed anticipated timeline of menopause and informed patient that a majority of women have improvement in their VSM within 5 years of the onset of menopause. Informed patient that any vaginal bleeding postmenopause should be reported to provider for further questions/evaluation. Informed patient of significant bone health changes during menopause due to decrease estrogen. Advised patient begin to take Calcium 1200-1500mg daily and incorporate weight bearing exercises in order to support bone health.  Discussed average age is 51. We will continue to re-assess at yearly physicals but I do not think further work up is necessary at this time because she is not having abnormal bleeding and not significantly past average age of menopause.     -Please call/reach out to police if she feels threatened for her safety  -Reach out if desiring social work referral at later time    Iliana Arnett, CATALINA CNP

## 2024-05-04 ENCOUNTER — MYC MEDICAL ADVICE (OUTPATIENT)
Dept: OBGYN | Facility: CLINIC | Age: 54
End: 2024-05-04
Payer: COMMERCIAL

## 2024-05-06 LAB
BKR LAB AP GYN ADEQUACY: NORMAL
BKR LAB AP GYN INTERPRETATION: NORMAL
BKR LAB AP HPV REFLEX: NORMAL
BKR LAB AP PREVIOUS ABNORMAL: NORMAL
PATH REPORT.COMMENTS IMP SPEC: NORMAL
PATH REPORT.COMMENTS IMP SPEC: NORMAL
PATH REPORT.RELEVANT HX SPEC: NORMAL

## 2024-05-08 LAB
HUMAN PAPILLOMA VIRUS 16 DNA: NEGATIVE
HUMAN PAPILLOMA VIRUS 18 DNA: NEGATIVE
HUMAN PAPILLOMA VIRUS FINAL DIAGNOSIS: NORMAL
HUMAN PAPILLOMA VIRUS OTHER HR: NEGATIVE

## 2024-08-03 ENCOUNTER — HEALTH MAINTENANCE LETTER (OUTPATIENT)
Age: 54
End: 2024-08-03

## 2024-09-16 ENCOUNTER — TELEPHONE (OUTPATIENT)
Dept: FAMILY MEDICINE | Facility: CLINIC | Age: 54
End: 2024-09-16
Payer: COMMERCIAL

## 2024-09-25 NOTE — TELEPHONE ENCOUNTER
Patient Quality Outreach    Patient is due for the following:   Colon Cancer Screening  Breast Cancer Screening - Mammogram    Next Steps:   Schedule a mammogram    Type of outreach:    Phone, left message for patient/parent to call back.      Questions for provider review:    None           Isa Benson        
Patient Quality Outreach    Patient is due for the following:   Physical Annual Wellness Visit    Next Steps:   Patient has upcoming appointment, these items will be addressed at that time.    Type of outreach:          Questions for provider review:    None           Isa Benson        
indigestion x1 week w/ chest and back discomfort w/ LLE swelling. Pt went to  and sent to ED for eval for abnormal EKG from previous on file.

## 2024-10-16 ENCOUNTER — OFFICE VISIT (OUTPATIENT)
Dept: FAMILY MEDICINE | Facility: CLINIC | Age: 54
End: 2024-10-16
Payer: COMMERCIAL

## 2024-10-16 VITALS
DIASTOLIC BLOOD PRESSURE: 75 MMHG | BODY MASS INDEX: 24.57 KG/M2 | SYSTOLIC BLOOD PRESSURE: 122 MMHG | OXYGEN SATURATION: 99 % | TEMPERATURE: 98.2 F | RESPIRATION RATE: 16 BRPM | HEART RATE: 63 BPM | WEIGHT: 152.9 LBS | HEIGHT: 66 IN

## 2024-10-16 DIAGNOSIS — G43.009 MIGRAINE WITHOUT AURA AND WITHOUT STATUS MIGRAINOSUS, NOT INTRACTABLE: Primary | ICD-10-CM

## 2024-10-16 DIAGNOSIS — R06.83 SNORING: ICD-10-CM

## 2024-10-16 PROCEDURE — 90471 IMMUNIZATION ADMIN: CPT | Performed by: STUDENT IN AN ORGANIZED HEALTH CARE EDUCATION/TRAINING PROGRAM

## 2024-10-16 PROCEDURE — 99214 OFFICE O/P EST MOD 30 MIN: CPT | Mod: 25 | Performed by: STUDENT IN AN ORGANIZED HEALTH CARE EDUCATION/TRAINING PROGRAM

## 2024-10-16 PROCEDURE — 90715 TDAP VACCINE 7 YRS/> IM: CPT | Performed by: STUDENT IN AN ORGANIZED HEALTH CARE EDUCATION/TRAINING PROGRAM

## 2024-10-16 RX ORDER — SUMATRIPTAN SUCCINATE 25 MG/1
25 TABLET ORAL
Qty: 10 TABLET | Refills: 2 | Status: SHIPPED | OUTPATIENT
Start: 2024-10-16

## 2024-10-16 ASSESSMENT — ENCOUNTER SYMPTOMS: HEADACHES: 1

## 2024-10-16 NOTE — PROGRESS NOTES
"  Assessment & Plan   Problem List Items Addressed This Visit    None  Visit Diagnoses       Migraine without aura and without status migrainosus, not intractable    -  Primary    Relevant Medications    SUMAtriptan (IMITREX) 25 MG tablet    Snoring        Relevant Orders    Adult Sleep Eval & Management  Referral           Longstanding history of symptoms consistent with migraines that are disabling.  No other focal deficits or red flag symptoms.  We did discuss treatment options for her migraines including continued use of NSAIDs at onset as well as other abortive and preventative medication options.  Will do trial of Imitrex now starting at 25 mg at onset of headache.  If not enough we can increase to 50 mg in the future.  She will keep headache diary to help with avoiding triggers.  Encourage lots of sleep and healthy diet as well as regular exercise.  Follow-up if things not adequately controlled.  Given her snoring history and fatigue we did discuss following up with sleep medicine to rule out sleep apnea.         Lida Wooten is a 53 year old, presenting for the following health issues:  Headache        10/16/2024     8:02 AM   Additional Questions   Roomed by Reddy     History of Present Illness       Headaches:   Since the patient's last clinic visit, headaches are: worsened  The patient is getting headaches:  1-2 per week  She is not able to do normal daily activities when she has a migraine.  The patient is taking the following rescue/relief medications:  Ibuprofen (Advil, Motrin), Tylenol and Excedrin   Patient states \"I get no relief\" from the rescue/relief medications.   The patient is taking the following medications to prevent migraines:  No medications to prevent migraines  In the past 4 weeks, the patient has gone to an Urgent Care or Emergency Room 0 times times due to headaches.   She is taking medications regularly.     Long hx of migraines usually lasting 8-12 hours and needs " "to sleep it off. Does get photophobia, phonophobia, nausea and vomiting. She needs to go home from work and can't function when she has one. Neck tightness does trigger them. Periods also trigger them. Certain situations with noises or lights can bring them on. Some tension headaches in the past as well. Excedrin migraine, tylenol and ibuprofen not often helping. 20 years of migraines but no previous treatment. 1-2 times per week. Sometimes linger into the next day.       Review of Systems  Constitutional, HEENT, cardiovascular, pulmonary, GI, , musculoskeletal, neuro, skin, endocrine and psych systems are negative, except as otherwise noted.      Objective    /75 (BP Location: Right arm, Patient Position: Sitting, Cuff Size: Adult Regular)   Pulse 63   Temp 98.2  F (36.8  C) (Temporal)   Resp 16   Ht 1.67 m (5' 5.75\")   Wt 69.4 kg (152 lb 14.4 oz)   LMP 10/02/2024 (Approximate)   SpO2 99%   BMI 24.87 kg/m    Body mass index is 24.87 kg/m .  Physical Exam   GENERAL: alert and no distress  EYES: Eyes grossly normal to inspection, PERRL and conjunctivae and sclerae normal  HENT: ear canals and TM's normal, nose and mouth without ulcers or lesions  NECK: no adenopathy, no asymmetry, masses, or scars  RESP: lungs clear to auscultation - no rales, rhonchi or wheezes  CV: regular rate and rhythm, normal S1 S2, no S3 or S4, no murmur, click or rub, no peripheral edema  ABDOMEN: soft, nontender, no hepatosplenomegaly, no masses and bowel sounds normal  MS: no gross musculoskeletal defects noted, no edema  SKIN: no suspicious lesions or rashes  NEURO: Normal strength and tone, no focal deficits and sensation intact, cranial nerves II through XII intact, mentation intact and speech normal  PSYCH: mentation appears normal, affect normal/bright          Signed Electronically by: Ronnie Tavares MD    "

## 2024-10-16 NOTE — NURSING NOTE
Prior to immunization administration, verified patients identity using patient s name and date of birth. Please see Immunization Activity for additional information.     Screening Questionnaire for Adult Immunization    Are you sick today?   No   Do you have allergies to medications, food, a vaccine component or latex?   No   Have you ever had a serious reaction after receiving a vaccination?   No   Do you have a long-term health problem with heart, lung, kidney, or metabolic disease (e.g., diabetes), asthma, a blood disorder, no spleen, complement component deficiency, a cochlear implant, or a spinal fluid leak?  Are you on long-term aspirin therapy?   No   Do you have cancer, leukemia, HIV/AIDS, or any other immune system problem?   No   Do you have a parent, brother, or sister with an immune system problem?   No   In the past 3 months, have you taken medications that affect  your immune system, such as prednisone, other steroids, or anticancer drugs; drugs for the treatment of rheumatoid arthritis, Crohn s disease, or psoriasis; or have you had radiation treatments?   No   Have you had a seizure, or a brain or other nervous system problem?   No   During the past year, have you received a transfusion of blood or blood    products, or been given immune (gamma) globulin or antiviral drug?   No   For women: Are you pregnant or is there a chance you could become       pregnant during the next month?   No   Have you received any vaccinations in the past 4 weeks?   No     Immunization questionnaire answers were all negative.      Patient instructed to remain in clinic for 15 minutes afterwards, and to report any adverse reactions.     Screening performed by Hailey Granado MA on 10/16/2024 at 8:38 AM.

## 2025-01-21 NOTE — PATIENT INSTRUCTIONS
If you have labs or imaging done, the results will automatically release in uTaP without an interpretation.  Your health care professional will review those results and send an interpretation with recommendations as soon as possible, but this may be 1-3 business days.    If you have any questions regarding your visit, please contact your care team.     [employer] [facility]: [phone]  [facility] CLINIC HOURS TELEPHONE NUMBER   Iliana Arnett, KEVEN Navarro-MAR Ness-MAR Pineda-Surgery Scheduler  Megan-       Monday- [place]  8:00 am-4:00 pm    Tuesday- [place]  8:00 am-4:00 pm    Wednesday- [place] 8:00 am-4:00 pm    Thursday- [place] 8:00 am-4:00 pm    Friday- [place]  8:00 am-4:00 pm [facility]  [address] [place]  [place] [address]  PH: [phone]  Fax: [phone]    Imaging Scheduling all locations  PH: [phone]     [facility] Labor [facility] Delivery  [address] [place] [address] [address]  [place], [place] [address] [phone]    [facility]  [address] [place] [address]  PH: [phone]     **Surgeries** Our Surgery Schedulers will contact you to schedule. If you do not receive a call within 3 business days, please call [phone].  Urgent Care locations:  [facility]       Monday-Friday   10 am - 8 pm    Saturday and Sunday   9 am - 5 pm   [phone]   If you need a medication refill, please contact your pharmacy. Please allow 3 business days for your refill to be completed.  As always, Thank you for trusting us with your healthcare needs!

## 2025-01-22 ENCOUNTER — HOSPITAL ENCOUNTER (OUTPATIENT)
Dept: ULTRASOUND IMAGING | Facility: CLINIC | Age: 55
Discharge: HOME | End: 2025-01-22
Payer: COMMERCIAL

## 2025-01-22 ENCOUNTER — OFFICE VISIT (OUTPATIENT)
Dept: OBGYN | Facility: CLINIC | Age: 55
End: 2025-01-22
Payer: COMMERCIAL

## 2025-01-22 VITALS
BODY MASS INDEX: 24.82 KG/M2 | WEIGHT: 152.6 LBS | HEART RATE: 59 BPM | SYSTOLIC BLOOD PRESSURE: 119 MMHG | DIASTOLIC BLOOD PRESSURE: 68 MMHG

## 2025-01-22 DIAGNOSIS — N93.9 ABNORMAL UTERINE BLEEDING (AUB): ICD-10-CM

## 2025-01-22 DIAGNOSIS — N93.9 ABNORMAL UTERINE BLEEDING (AUB): Primary | ICD-10-CM

## 2025-01-22 LAB — TSH SERPL DL<=0.005 MIU/L-ACNC: 1.22 UIU/ML (ref 0.3–4.2)

## 2025-01-22 PROCEDURE — 76856 US EXAM PELVIC COMPLETE: CPT

## 2025-01-22 PROCEDURE — 76830 TRANSVAGINAL US NON-OB: CPT

## 2025-01-22 PROCEDURE — 99214 OFFICE O/P EST MOD 30 MIN: CPT

## 2025-01-22 PROCEDURE — 84443 ASSAY THYROID STIM HORMONE: CPT

## 2025-01-22 PROCEDURE — 36415 COLL VENOUS BLD VENIPUNCTURE: CPT

## 2025-01-22 RX ORDER — NORETHINDRONE 5 MG/1
5 TABLET ORAL DAILY
Qty: 90 TABLET | Refills: 1 | Status: SHIPPED | OUTPATIENT
Start: 2025-01-22

## 2025-01-22 RX ORDER — LORATADINE 10 MG/1
TABLET ORAL
COMMUNITY
Start: 2024-04-01

## 2025-01-22 NOTE — PROGRESS NOTES
Subjective:  Sugar is a 54 year old   is here today with the following concerns:    AUB: she is still having monthly cycles but since 10/24, she has started to have spotting in between periods, typically starting 4-7d after the end of her menses. Sometimes it will last until her next period starts. Reports spotting is generally very light and requires 1 pad per day. No recent imaging. No use of birth control currently. She is open to any form of management at this point as long as the bleeding stops.      ROS: Pertinent ROS as above.    Medical history  OB History    Para Term  AB Living   1 1 1 0 0 1   SAB IAB Ectopic Multiple Live Births   0 0 0 0 1      # Outcome Date GA Lbr Messi/2nd Weight Sex Type Anes PTL Lv   1 Term     M Vag-Spont   CARL      Past Medical History:   Diagnosis Date    Contusion of right hand, initial encounter 2016    Pain of right hand 2016    Tendonitis 2016    right hand       Past Surgical History:   Procedure Laterality Date    HC SHLDR ARTHROSCOP,PART ACROMIOPLAS      left    PELVIS LAPAROSCOPY,DX      PELVIS LAPAROSCOPY,DX         ALL/Meds: Her medication and allergy histories were reviewed and are documented in their appropriate chart areas.    SH: Reviewed and documented in the appropriate area of the chart.  FH:  Her family history is reviewed and updated in the chart, today.  PMH: Her past medical, surgical, and obstetric histories were reviewed and updated today in the appropriate chart areas.    Objective:  PE: /68   Pulse (!) 59   Wt 69.2 kg (152 lb 9.6 oz)   LMP 2025   BMI 24.82 kg/m    Body mass index is 24.82 kg/m .    Pertinent Physical exam findings:    GENERAL: Active, alert, in no acute distress.  SKIN: Clear. No significant rash, abnormal pigmentation or lesions  MS: no gross musculoskeletal defects noted, no edema  PSYCH: Age-appropriate alertness and orientation    A/P:  Sugar Bishop is a 54 year  old  here today with the following concerns:  (N93.9) Abnormal uterine bleeding (AUB)  (primary encounter diagnosis)  Comment: We discussed the various etiologies of abnormal bleeding such as fibroids, polyps, adenomyosis, malignancy, hyperplasia, infection, bleeding disorder, thyroid/endocrine dysfunction, and ovarian dysfunction. Based on her presenting history, the cause is not clear at this time. We discussed the various causes of abnormal bleeding listed above and discussed that we can initiate her on medication to help control the bleeding but that we do need to proceed with TVUS and most likely EMBx going forward. She is agreeable to this. She denies any history of blood clots. We discussed proper way to take Aygestin and to reach out if she dislikes.  Plan: US Pelvic Complete with Transvaginal, TSH with         free T4 reflex, norethindrone (AYGESTIN) 5 MG         tablet          She is agreeable to the plan above and has no further questions or concerns. She did not request a chaperone for the physical exam component of the visit today.     CATALINA Foster CNP

## 2025-04-01 ENCOUNTER — PATIENT OUTREACH (OUTPATIENT)
Dept: CARE COORDINATION | Facility: CLINIC | Age: 55
End: 2025-04-01
Payer: COMMERCIAL

## 2025-04-15 ENCOUNTER — PATIENT OUTREACH (OUTPATIENT)
Dept: CARE COORDINATION | Facility: CLINIC | Age: 55
End: 2025-04-15
Payer: COMMERCIAL

## 2025-05-15 ENCOUNTER — TELEPHONE (OUTPATIENT)
Dept: FAMILY MEDICINE | Facility: CLINIC | Age: 55
End: 2025-05-15

## 2025-06-14 ENCOUNTER — HEALTH MAINTENANCE LETTER (OUTPATIENT)
Age: 55
End: 2025-06-14

## 2025-08-16 ENCOUNTER — HEALTH MAINTENANCE LETTER (OUTPATIENT)
Age: 55
End: 2025-08-16

## 2025-08-20 ENCOUNTER — MYC MEDICAL ADVICE (OUTPATIENT)
Dept: DERMATOLOGY | Facility: CLINIC | Age: 55
End: 2025-08-20
Payer: COMMERCIAL

## 2025-08-27 ENCOUNTER — OFFICE VISIT (OUTPATIENT)
Dept: DERMATOLOGY | Facility: CLINIC | Age: 55
End: 2025-08-27
Payer: COMMERCIAL

## 2025-08-27 DIAGNOSIS — D22.9 MULTIPLE BENIGN NEVI: ICD-10-CM

## 2025-08-27 DIAGNOSIS — L82.1 SK (SEBORRHEIC KERATOSIS): ICD-10-CM

## 2025-08-27 DIAGNOSIS — D18.01 CHERRY ANGIOMA: ICD-10-CM

## 2025-08-27 DIAGNOSIS — Z12.83 SCREENING EXAM FOR SKIN CANCER: Primary | ICD-10-CM

## 2025-08-27 DIAGNOSIS — L81.4 LENTIGINES: ICD-10-CM

## 2025-08-27 PROCEDURE — 99203 OFFICE O/P NEW LOW 30 MIN: CPT | Performed by: PHYSICIAN ASSISTANT

## 2025-08-27 PROCEDURE — 1126F AMNT PAIN NOTED NONE PRSNT: CPT | Performed by: PHYSICIAN ASSISTANT

## 2025-08-27 RX ORDER — TOPIRAMATE SPINKLE 25 MG/1
CAPSULE ORAL
COMMUNITY
Start: 2025-06-17

## 2025-08-27 RX ORDER — PRAVASTATIN SODIUM 20 MG
TABLET ORAL
COMMUNITY
Start: 2025-06-17

## 2025-08-27 RX ORDER — FLUTICASONE PROPIONATE 50 MCG
SPRAY, SUSPENSION (ML) NASAL
COMMUNITY
Start: 2025-06-17

## 2025-08-27 ASSESSMENT — PAIN SCALES - GENERAL: PAINLEVEL_OUTOF10: NO PAIN (0)
